# Patient Record
Sex: MALE | Race: WHITE | NOT HISPANIC OR LATINO | Employment: OTHER | URBAN - METROPOLITAN AREA
[De-identification: names, ages, dates, MRNs, and addresses within clinical notes are randomized per-mention and may not be internally consistent; named-entity substitution may affect disease eponyms.]

---

## 2023-02-21 LAB — PROSTATE SPECIFIC AG (NG/ML) IN SER/PLAS: 2.43 NG/ML (ref 0–4)

## 2023-08-29 LAB — PROSTATE SPECIFIC AG (NG/ML) IN SER/PLAS: 3.06 NG/ML (ref 0–4)

## 2023-10-16 PROBLEM — G62.9 PERIPHERAL NEUROPATHY: Status: ACTIVE | Noted: 2023-10-16

## 2023-10-16 PROBLEM — Z92.3 HISTORY OF RADIATION THERAPY: Status: ACTIVE | Noted: 2023-10-16

## 2023-10-16 PROBLEM — I50.32 CHRONIC DIASTOLIC CONGESTIVE HEART FAILURE (MULTI): Status: ACTIVE | Noted: 2023-10-16

## 2023-10-16 PROBLEM — L57.0 ACTINIC KERATOSIS: Status: ACTIVE | Noted: 2019-09-23

## 2023-10-16 PROBLEM — R09.89 CAROTID BRUIT: Status: ACTIVE | Noted: 2023-10-16

## 2023-10-16 PROBLEM — K57.32 DIVERTICULITIS, COLON: Status: ACTIVE | Noted: 2023-10-16

## 2023-10-16 PROBLEM — L85.3 XEROSIS CUTIS: Status: ACTIVE | Noted: 2019-09-23

## 2023-10-16 PROBLEM — L03.116 CELLULITIS OF LEFT LOWER EXTREMITY: Status: ACTIVE | Noted: 2023-10-16

## 2023-10-16 PROBLEM — L91.8 OTHER HYPERTROPHIC DISORDERS OF THE SKIN: Status: ACTIVE | Noted: 2019-09-23

## 2023-10-16 PROBLEM — N40.0 BPH (BENIGN PROSTATIC HYPERPLASIA): Status: ACTIVE | Noted: 2023-10-16

## 2023-10-16 PROBLEM — C44.329 SQUAMOUS CELL CARCINOMA OF SKIN OF OTHER PARTS OF FACE: Status: ACTIVE | Noted: 2019-09-23

## 2023-10-16 PROBLEM — R91.8 PULMONARY NODULES: Status: ACTIVE | Noted: 2023-10-16

## 2023-10-16 PROBLEM — I73.9 CLAUDICATION (CMS-HCC): Status: ACTIVE | Noted: 2023-10-16

## 2023-10-16 PROBLEM — C61 PROSTATE CANCER (MULTI): Status: ACTIVE | Noted: 2023-10-16

## 2023-10-16 PROBLEM — B35.1 ONYCHOMYCOSIS OF TOENAIL: Status: ACTIVE | Noted: 2023-10-16

## 2023-10-16 PROBLEM — L82.1 OTHER SEBORRHEIC KERATOSIS: Status: ACTIVE | Noted: 2019-09-23

## 2023-10-16 PROBLEM — I65.21 MORE THAN 50 PERCENT STENOSIS OF RIGHT INTERNAL CAROTID ARTERY: Status: ACTIVE | Noted: 2023-10-16

## 2023-10-16 PROBLEM — C44.42 SQUAMOUS CELL CARCINOMA OF SKIN OF SCALP AND NECK: Status: ACTIVE | Noted: 2019-09-23

## 2023-10-16 PROBLEM — I10 HTN (HYPERTENSION): Status: ACTIVE | Noted: 2023-10-16

## 2023-10-16 PROBLEM — Z85.828 PERSONAL HISTORY OF OTHER MALIGNANT NEOPLASM OF SKIN: Status: ACTIVE | Noted: 2019-09-23

## 2023-10-16 PROBLEM — Z98.890 H/O CAROTID ENDARTERECTOMY: Status: ACTIVE | Noted: 2023-10-16

## 2023-10-16 PROBLEM — L81.4 OTHER MELANIN HYPERPIGMENTATION: Status: ACTIVE | Noted: 2019-09-23

## 2023-10-16 PROBLEM — D18.01 HEMANGIOMA OF SKIN AND SUBCUTANEOUS TISSUE: Status: ACTIVE | Noted: 2019-09-23

## 2023-10-16 PROBLEM — Z79.818 ANDROGEN DEPRIVATION THERAPY: Status: ACTIVE | Noted: 2023-10-16

## 2023-10-16 RX ORDER — HYDROCHLOROTHIAZIDE 12.5 MG/1
CAPSULE ORAL
COMMUNITY
Start: 2023-03-21

## 2023-10-16 RX ORDER — SOLIFENACIN SUCCINATE 10 MG/1
10 TABLET, FILM COATED ORAL DAILY
COMMUNITY
End: 2023-10-18 | Stop reason: ALTCHOICE

## 2023-10-16 RX ORDER — ALBUTEROL SULFATE 90 UG/1
AEROSOL, METERED RESPIRATORY (INHALATION)
COMMUNITY
Start: 2023-04-18

## 2023-10-16 RX ORDER — UREA 200 MG/G
CREAM TOPICAL
COMMUNITY
Start: 2019-08-09 | End: 2023-10-18 | Stop reason: ALTCHOICE

## 2023-10-16 RX ORDER — METRONIDAZOLE 500 MG/1
TABLET ORAL
COMMUNITY
End: 2023-10-18 | Stop reason: ALTCHOICE

## 2023-10-16 RX ORDER — ASPIRIN 81 MG/1
1 TABLET ORAL DAILY
COMMUNITY
Start: 2018-08-23

## 2023-10-16 RX ORDER — TAMSULOSIN HYDROCHLORIDE 0.4 MG/1
1 CAPSULE ORAL DAILY
COMMUNITY
Start: 2017-06-07 | End: 2024-03-18 | Stop reason: SDUPTHER

## 2023-10-16 RX ORDER — LISINOPRIL 5 MG/1
TABLET ORAL
COMMUNITY

## 2023-10-18 ENCOUNTER — OFFICE VISIT (OUTPATIENT)
Dept: CARDIOLOGY | Facility: HOSPITAL | Age: 88
End: 2023-10-18
Payer: MEDICARE

## 2023-10-18 ENCOUNTER — HOSPITAL ENCOUNTER (OUTPATIENT)
Dept: VASCULAR MEDICINE | Facility: HOSPITAL | Age: 88
Discharge: HOME | End: 2023-10-18
Payer: MEDICARE

## 2023-10-18 VITALS
SYSTOLIC BLOOD PRESSURE: 164 MMHG | HEART RATE: 71 BPM | WEIGHT: 192.02 LBS | DIASTOLIC BLOOD PRESSURE: 74 MMHG | OXYGEN SATURATION: 98 % | BODY MASS INDEX: 27.55 KG/M2

## 2023-10-18 DIAGNOSIS — Z98.890 H/O CAROTID ENDARTERECTOMY: Primary | ICD-10-CM

## 2023-10-18 DIAGNOSIS — I65.21 MORE THAN 50 PERCENT STENOSIS OF RIGHT INTERNAL CAROTID ARTERY: ICD-10-CM

## 2023-10-18 DIAGNOSIS — R09.89 BRUIT: ICD-10-CM

## 2023-10-18 PROCEDURE — 3078F DIAST BP <80 MM HG: CPT | Performed by: INTERNAL MEDICINE

## 2023-10-18 PROCEDURE — 93880 EXTRACRANIAL BILAT STUDY: CPT

## 2023-10-18 PROCEDURE — 1126F AMNT PAIN NOTED NONE PRSNT: CPT | Performed by: INTERNAL MEDICINE

## 2023-10-18 PROCEDURE — 93880 EXTRACRANIAL BILAT STUDY: CPT | Performed by: INTERNAL MEDICINE

## 2023-10-18 PROCEDURE — 3077F SYST BP >= 140 MM HG: CPT | Performed by: INTERNAL MEDICINE

## 2023-10-18 PROCEDURE — 99214 OFFICE O/P EST MOD 30 MIN: CPT | Mod: PO | Performed by: INTERNAL MEDICINE

## 2023-10-18 PROCEDURE — 99214 OFFICE O/P EST MOD 30 MIN: CPT | Performed by: INTERNAL MEDICINE

## 2023-10-18 PROCEDURE — 1160F RVW MEDS BY RX/DR IN RCRD: CPT | Performed by: INTERNAL MEDICINE

## 2023-10-18 PROCEDURE — 1159F MED LIST DOCD IN RCRD: CPT | Performed by: INTERNAL MEDICINE

## 2023-10-18 RX ORDER — ATORVASTATIN CALCIUM 20 MG/1
20 TABLET, FILM COATED ORAL DAILY
Qty: 90 TABLET | Refills: 3 | Status: SHIPPED | OUTPATIENT
Start: 2023-10-18 | End: 2024-10-17

## 2023-10-18 NOTE — PROGRESS NOTES
Chief Complaint:   Carotid artery stenosis     History Of Present Illness:    Mike Wiley is a 89 y.o. male who follows up for asymptomatic carotid artery stenosis. He had right CEA about two decades ago. FAST reviewed; no symptoms. Doing well. Feels unsteady at times on his feet. We discussed physical therapy for gait and balance but he is not interested in going to therapy.    Going to [facility] for 12 weeks. Will be back in March.     Last Recorded Vitals:  Vitals:    10/18/23 1457   BP: 164/74   Pulse: 71   SpO2: 98%   Weight: 87.1 kg (192 lb 0.3 oz)       Past Medical History:   Diagnosis Date    Personal history of other diseases of the circulatory system 06/16/2020    History of aortic valve stenosis    Personal history of other diseases of the musculoskeletal system and connective tissue     History of arthritis     Past Surgical History:   Procedure Laterality Date    CT ABDOMEN PELVIS ANGIOGRAM W AND/OR WO IV CONTRAST  6/24/2020    CT ABDOMEN PELVIS ANGIOGRAM W AND/OR WO IV CONTRAST 6/24/2020 [facility] [place] ANCILLARY LEGACY    CT NECK ANGIO W AND WO IV CONTRAST  7/21/2020    CT NECK ANGIO W AND WO IV CONTRAST 7/21/2020 [facility] ANCILLARY LEGACY    CT NECK ANGIO W AND WO IV CONTRAST  11/21/2018    CT NECK ANGIO W AND WO IV CONTRAST 11/21/2018 [facility] ANCILLARY LEGAC[facility]    HERNIA REPAIR  07/26/2017    Hernia Repair    OTHER SURGICAL HISTORY  07/26/2017    Curettage Of Carpal Bones           Social History:  He reports that he has been smoking pipe. He has never used smokeless tobacco. He reports that he does not drink alcohol and does not use drugs.    Family History:  Family History   Problem Relation Name Age of Onset    Other (cardiac disorder) Mother      Stroke Father      Esophageal cancer Brother          Allergies:  Patient has no known allergies.    Outpatient Medications:  Current Outpatient Medications   Medication Instructions    aspirin 81 mg EC tablet 1 tablet, oral, Daily    GABAPENTIN ORAL 1 tablet, oral,  "Daily    hydroCHLOROthiazide (Microzide) 12.5 mg capsule     lisinopril 5 mg tablet TAKE 1 TABLET ONE TIME DAILY (DOSE DECREASE)    metroNIDAZOLE (Flagyl) 500 mg tablet     solifenacin (VESICARE) 10 mg, oral, Daily    tamsulosin (Flomax) 0.4 mg 24 hr capsule 1 tablet, oral, Daily    urea (Carmol) 20 % cream 1 Application    Ventolin HFA 90 mcg/actuation inhaler        Physical Exam:  No distress  No JVD or carotid bruits  Lungs clear bilaterally  Heart regular and without murmurs  Abdomen soft and non-tender  No leg swelling  Pulses intact       Last Labs:  CBC -  Lab Results   Component Value Date    WBC 4.6 07/13/2020    HGB 12.6 (L) 07/13/2020    HCT 39.9 (L) 07/13/2020    MCV 96 07/13/2020     (L) 07/13/2020       CMP -  Lab Results   Component Value Date    CALCIUM 9.3 10/19/2020    PHOS 2.9 07/08/2020    PROT 7.3 11/04/2019    ALBUMIN 3.6 07/08/2020    ALBUMIN 4.1 08/10/2018    AST 23 11/04/2019    ALT 27 11/04/2019    ALKPHOS 63 11/04/2019    BILITOT 0.5 11/04/2019       LIPID PANEL -   LDL 89  HDL 51 from 3/8/2023    RENAL FUNCTION PANEL -   Lab Results   Component Value Date    GLUCOSE 113 (H) 10/19/2020     10/19/2020    K 5.0 10/19/2020     10/19/2020    CO2 30 10/19/2020    ANIONGAP 11 10/19/2020    BUN 18 10/19/2020    CREATININE 1.09 10/19/2020    CALCIUM 9.3 10/19/2020    PHOS 2.9 07/08/2020    ALBUMIN 3.6 07/08/2020    ALBUMIN 4.1 08/10/2018        No results found for: \"BNP\", \"HGBA1C\"    Last Cardiology Tests:  Carotid duplex ultrasound today shows bilateral > 70% stenosis by PSV criteria. The right ICA/CCA ratio has decreased (because of increased velocities in the common); left ICA is stable      Assessment/Plan   Diagnoses and all orders for this visit:  H/O carotid endarterectomy  -     Vascular US Carotid Artery Duplex Bilateral; Future (6 months)  More than 50 percent stenosis of right internal carotid artery  -     Vascular US Carotid Artery Duplex Bilateral; Future  -     " atorvastatin (Lipitor) 20 mg tablet; Take 1 tablet (20 mg) by mouth once daily.      Charo Hansen MD

## 2023-10-18 NOTE — PATIENT INSTRUCTIONS
I want you to start a cholesterol-lowering medication called atorvastatin to help prevent progression of carotid artery blockages. I e-scripted 90 days with refills to [facility]. If you have issues or concerns with the new medication, please call my office to let me know.    Should you have questions, please do not hesitate to call my office at [phone].    I want to see you back in 6 months with a repeat carotid ultrasound.

## 2024-03-18 ENCOUNTER — OFFICE VISIT (OUTPATIENT)
Dept: UROLOGY | Facility: CLINIC | Age: 89
End: 2024-03-18
Payer: MEDICARE

## 2024-03-18 ENCOUNTER — LAB (OUTPATIENT)
Dept: LAB | Facility: LAB | Age: 89
End: 2024-03-18
Payer: MEDICARE

## 2024-03-18 DIAGNOSIS — N40.1 BENIGN PROSTATIC HYPERPLASIA WITH LOWER URINARY TRACT SYMPTOMS, SYMPTOM DETAILS UNSPECIFIED: ICD-10-CM

## 2024-03-18 DIAGNOSIS — C61 PROSTATE CANCER (MULTI): ICD-10-CM

## 2024-03-18 LAB — PSA SERPL-MCNC: 2.15 NG/ML

## 2024-03-18 PROCEDURE — 84153 ASSAY OF PSA TOTAL: CPT

## 2024-03-18 PROCEDURE — 36415 COLL VENOUS BLD VENIPUNCTURE: CPT

## 2024-03-18 PROCEDURE — 99214 OFFICE O/P EST MOD 30 MIN: CPT | Performed by: STUDENT IN AN ORGANIZED HEALTH CARE EDUCATION/TRAINING PROGRAM

## 2024-03-18 RX ORDER — SOLIFENACIN SUCCINATE 10 MG/1
10 TABLET, FILM COATED ORAL DAILY
Qty: 30 TABLET | Refills: 11 | Status: SHIPPED | OUTPATIENT
Start: 2024-03-18 | End: 2024-05-15 | Stop reason: WASHOUT

## 2024-03-18 RX ORDER — SOLIFENACIN SUCCINATE 10 MG/1
10 TABLET, FILM COATED ORAL DAILY
COMMUNITY
Start: 2023-10-09 | End: 2024-03-18 | Stop reason: SDUPTHER

## 2024-03-18 RX ORDER — TAMSULOSIN HYDROCHLORIDE 0.4 MG/1
0.8 CAPSULE ORAL DAILY
Qty: 60 CAPSULE | Refills: 11 | Status: SHIPPED | OUTPATIENT
Start: 2024-03-18 | End: 2025-03-18

## 2024-03-18 NOTE — PROGRESS NOTES
Subjective   Patient ID: Mike Wiley is a 89 y.o. male.    HPI  89 yo male with history of [place] 7 Prostate cancer. Clinical T2cNx, which he completed definitive curative intent radiation in March 2018 with 6 months of anti androgen therapy. F/U BPH. Now, PSA is 3.06, increased from 2.4 in 6 months.      On tamsulosin single dose, he tried myrbetriq and did not help with symptoms. he is out of medications now. He is on solifenacin 10 mg. Notes urinary frequency and nocturia.     02/20/23 PET/PSMA:  1. Few F-18 PSMA avid foci in the prostate gland, likely representing  recurrent prostate cancer.  2. No PET evidence of metastasis to bilateral seminal vesicles or  anterior rectal wall. No PET evidence of locoregional isa or  distant metastasis.    Lab Results   Component Value Date    PSA 2.15 03/18/2024    PSA 3.06 08/29/2023    PSA 2.43 02/21/2023    PSA 3.19 10/27/2022    PSA 1.69 05/23/2022    PSA 1.57 11/08/2021       Review of Systems    Objective   Physical Exam    Assessment/Plan   89 yo male with history of [place] 7 Prostate cancer. Clinical T2cNx, which he completed definitive curative intent radiation in March 2018 with 6 months of anti androgen therapy. F/U BPH. Now, PSA is 3.06, increased from 2.4 in 6 months.      PET/PSMA revealed a Few F-18 PSMA avid foci in the prostate gland, likely representing  recurrent prostate cancer. No evidence of metastasis to bilateral seminal vesicles or anterior rectal wall. No PET evidence of locoregional isa or distant metastasis.     On tamsulosin single dose, he tried myrbetriq and did not help with symptoms. he is out of medications now. He is on solifenacin 10 mg. Notes urinary frequency and nocturia. Will increase tamsulosin x 2. We discussed risks, benefits, alternatives and side effects.     We will repeat PSA today and decide if PET is recommended depeding on results.     Plan:  Make tamsulosin x 2, 0.4 mg 1 tab daily at bedtime,   Continue vesicare 10 mg  one tab daily in the morning.   PSA today.   According to PSA we will decide on repeating PET scan.   FUV 2 months.   Diagnoses and all orders for this visit:  Benign prostatic hyperplasia with lower urinary tract symptoms, symptom details unspecified  -     tamsulosin (Flomax) 0.4 mg 24 hr capsule; Take 2 capsules (0.8 mg) by mouth once daily.  -     solifenacin (VESIcare) 10 mg tablet; Take 1 tablet (10 mg) by mouth once daily.  Prostate cancer (CMS/HCC)  -     Prostate Specific Antigen; Future      Scribe Attestation  By signing my name below, I, Dora Loo, Scribe attest that this documentation has been prepared under the direction and in the presence of Edith Hearn MD.

## 2024-05-15 ENCOUNTER — OFFICE VISIT (OUTPATIENT)
Dept: CARDIOLOGY | Facility: HOSPITAL | Age: 89
End: 2024-05-15
Payer: MEDICARE

## 2024-05-15 ENCOUNTER — HOSPITAL ENCOUNTER (OUTPATIENT)
Dept: VASCULAR MEDICINE | Facility: HOSPITAL | Age: 89
Discharge: HOME | End: 2024-05-15
Payer: MEDICARE

## 2024-05-15 VITALS
OXYGEN SATURATION: 100 % | SYSTOLIC BLOOD PRESSURE: 159 MMHG | DIASTOLIC BLOOD PRESSURE: 75 MMHG | HEART RATE: 64 BPM | WEIGHT: 188.05 LBS | BODY MASS INDEX: 26.98 KG/M2

## 2024-05-15 DIAGNOSIS — Z98.890 H/O CAROTID ENDARTERECTOMY: Primary | ICD-10-CM

## 2024-05-15 DIAGNOSIS — I65.21 MORE THAN 50 PERCENT STENOSIS OF RIGHT INTERNAL CAROTID ARTERY: ICD-10-CM

## 2024-05-15 DIAGNOSIS — Z98.890 H/O CAROTID ENDARTERECTOMY: ICD-10-CM

## 2024-05-15 PROCEDURE — 93880 EXTRACRANIAL BILAT STUDY: CPT | Performed by: INTERNAL MEDICINE

## 2024-05-15 PROCEDURE — 1160F RVW MEDS BY RX/DR IN RCRD: CPT | Performed by: INTERNAL MEDICINE

## 2024-05-15 PROCEDURE — 3078F DIAST BP <80 MM HG: CPT | Performed by: INTERNAL MEDICINE

## 2024-05-15 PROCEDURE — 99214 OFFICE O/P EST MOD 30 MIN: CPT | Performed by: INTERNAL MEDICINE

## 2024-05-15 PROCEDURE — 3077F SYST BP >= 140 MM HG: CPT | Performed by: INTERNAL MEDICINE

## 2024-05-15 PROCEDURE — 1159F MED LIST DOCD IN RCRD: CPT | Performed by: INTERNAL MEDICINE

## 2024-05-15 PROCEDURE — 93880 EXTRACRANIAL BILAT STUDY: CPT

## 2024-05-15 NOTE — PROGRESS NOTES
Asymptomatic carotid artery stenosis    History of Present Illness:  Mike Wiley is a/an 89 y.o. man who follows up for asymptomatic carotid stenosis.     He was last seen on 10/18/2023.    He has a history of right CEA twenty years ago, with restenosis of the right ICA and progressive stenosis of the left ICA.    He denies hemispheric symptoms including weakness, loss of sensation, and facial droop; amaurosis; garbled speech or word-finding difficulties.    He continues to smoke a pipe. Inhales lightly. Prior cigarettes.    Past Medical History:   Diagnosis Date    Personal history of other diseases of the circulatory system 06/16/2020    History of aortic valve stenosis    Personal history of other diseases of the musculoskeletal system and connective tissue     History of arthritis     Past Surgical History:   Procedure Laterality Date    CT ABDOMEN PELVIS ANGIOGRAM W AND/OR WO IV CONTRAST  6/24/2020    CT ABDOMEN PELVIS ANGIOGRAM W AND/OR WO IV CONTRAST 6/24/2020 [facility] [place] ANCILLARY LEGACY    CT ANGIO NECK  7/21/2020    CT NECK ANGIO W AND WO IV CONTRAST 7/21/2020 [facility] ANCILLARY LEGACY    CT ANGIO NECK  11/21/2018    CT NECK ANGIO W AND WO IV CONTRAST 11/21/2018 [facility] ANCILLARY LEGACY    HERNIA REPAIR  07/26/2017    Hernia Repair    OTHER SURGICAL HISTORY  07/26/2017    Curettage Of Carpal Bones     Social History     Tobacco Use    Smoking status: Every Day     Types: Pipe    Smokeless tobacco: Never   Substance Use Topics    Alcohol use: Never    Drug use: Never     Family History   Problem Relation Name Age of Onset    Other (cardiac disorder) Mother      Stroke Father      Esophageal cancer Brother       Current Outpatient Medications   Medication Sig Dispense Refill    aspirin 81 mg EC tablet Take 1 tablet (81 mg) by mouth once daily.      atorvastatin (Lipitor) 20 mg tablet Take 1 tablet (20 mg) by mouth once daily. 90 tablet 3    GABAPENTIN ORAL Take 1 tablet by mouth once daily.      hydroCHLOROthiazide  (Microzide) 12.5 mg capsule       lisinopril 5 mg tablet TAKE 1 TABLET ONE TIME DAILY (DOSE DECREASE)      tamsulosin (Flomax) 0.4 mg 24 hr capsule Take 2 capsules (0.8 mg) by mouth once daily. 60 capsule 11    solifenacin (VESIcare) 10 mg tablet Take 1 tablet (10 mg) by mouth once daily. (Patient not taking: Reported on 5/15/2024) 30 tablet 11    Ventolin HFA 90 mcg/actuation inhaler        No current facility-administered medications for this visit.       Physical Examination:  Blood pressure 159/75, pulse 64, weight 85.3 kg (188 lb 0.8 oz), SpO2 100%.  No distress  No JVD or carotid bruits  Lungs clear bilaterally  Heart regular and without murmurs  Abdomen soft and non-tender  No leg swelling  Pulses intact    Pertinent Labs:  Cholesterol, Total  <200 mg/dL 94   Comment: <200 mg/dL, Desirable   200-239 mg/dL, Borderline high  >239 mg/dL, High   Triglyceride  <150 mg/dL 50   Comment: <150 mg/dL, Normal   150-199 mg/dL, Borderline high   200-499 mg/dL, High  >499 mg/dL, Very high   HDL Cholesterol  >39 mg/dL 55   Comment:  40-59 mg/dL, Acceptable  >59 mg/dL, High: Negative risk factor for coronary heart disease  <40 mg/dL, Low: Positive risk factor for coronary heart disease   Non HDL Cholesterol  <130 mg/dL 39   Comment: <130 mg/dL, Optimal   130-159 mg/dL, Near optimal/above optimal   160-189 mg/dL, Borderline high   190-219 mg/dL, High  >219 mg/dL, Very high  Secondary prevention optimal non HDL Cholesterol levels are recommended to be <100 mg/dL   Fasting Time  hrs 12   VLDL Cholesterol  <30 mg/dL 10   TC:HDL Ratio  <5.10 1.71   LDL Cholesterol  <100 mg/dL 29   Comment: <100 mg/dL, Optimal   100-129 mg/dL, Near optimal/above optimal   130-159 mg/dL, Borderline high   160-189 mg/dL, High  >189 mg/dL, Very high  Secondary prevention optimal LDL Cholesterol levels are recommended to be < 70 mg/dL   LDL:HDL Ratio  <2.54 0.53       Pertinent Imaging:  Carotid artery duplex ultrasound 05/15/24 (personally  reviewed)  Right ICA with elevated PSV, EDV, and ICA/CCA ratio consistent with greater than 70% stenosis. This is stable. Left ICA PSV has increased, though EDV remains below 100 cm/s and ICA/CCA ratio is less than 4. This likely represents a stenosis at the low end of the 70-99% category, and velocities may be overestimated due to contralateral critical stenosis.    Diagnoses and all orders for this visit:  H/O carotid endarterectomy (Primary)  More than 50 percent stenosis of right internal carotid artery  I'm concerned that he has significant right carotid stenosis and seems to be progressing on the left.  I would like him to see one of our vascular interventionalists to get an opinion on candidacy for carotid stenting  Continue statin    Follow up to be determined.    Charo Hansen MD, MS

## 2024-05-22 ENCOUNTER — OFFICE VISIT (OUTPATIENT)
Dept: UROLOGY | Facility: CLINIC | Age: 89
End: 2024-05-22
Payer: MEDICARE

## 2024-05-22 DIAGNOSIS — N40.1 BENIGN PROSTATIC HYPERPLASIA WITH URINARY FREQUENCY: ICD-10-CM

## 2024-05-22 DIAGNOSIS — R35.0 BENIGN PROSTATIC HYPERPLASIA WITH URINARY FREQUENCY: ICD-10-CM

## 2024-05-22 DIAGNOSIS — Z85.46 HISTORY OF PROSTATE CANCER: ICD-10-CM

## 2024-05-22 RX ORDER — SOLIFENACIN SUCCINATE 10 MG/1
10 TABLET, FILM COATED ORAL DAILY
Qty: 30 TABLET | Refills: 11 | Status: SHIPPED | OUTPATIENT
Start: 2024-05-22 | End: 2025-05-22

## 2024-05-22 NOTE — PROGRESS NOTES
Subjective   Patient ID: Mike Wiley is a 89 y.o. male.    HPI  89 yo male with history of [place] 7 Prostate cancer. Clinical T2cNx, which he completed definitive curative intent radiation in March 2018 with 6 months of anti androgen therapy. F/U BPH. Now, PSA is 2.15.      On tamsulosin double dose, sxs well controlled. Acceptable quality of life, wishes to continue on med regimen.      02/20/23 PET/PSMA:  1. Few F-18 PSMA avid foci in the prostate gland, likely representing  recurrent prostate cancer.  2. No PET evidence of metastasis to bilateral seminal vesicles or  anterior rectal wall. No PET evidence of locoregional isa or  distant metastasis.     Lab Results   Component Value Date    PSA 2.15 03/18/2024    PSA 3.06 08/29/2023    PSA 2.43 02/21/2023    PSA 3.19 10/27/2022    PSA 1.69 05/23/2022    PSA 1.57 11/08/2021       Review of Systems    Objective   Physical Exam    Assessment/Plan   89 yo male with history of [place] 7 Prostate cancer. Clinical T2cNx, which he completed definitive curative intent radiation in March 2018 with 6 months of anti androgen therapy. F/U BPH. Now, PSA is 2.15.     PET/PSMA revealed a Few F-18 PSMA avid foci in the prostate gland, likely representing  recurrent prostate cancer. No evidence of metastasis to bilateral seminal vesicles or anterior rectal wall. No PET evidence of locoregional isa or distant metastasis.      On tamsulosin double dose, doing well on medication. We discussed his urinary sxs, and changing his meds, we will continue current treatment since he has acceptable quality of life.      We will repeat PSA in September 2024.     Plan:  Continue tamsulosin 0.4  mg 2 pills daily at bedtime.  PSA September 2024.   FUV October 2024.   Diagnoses and all orders for this visit:  History of prostate cancer  -     Prostate Specific Antigen; Future  Benign prostatic hyperplasia with urinary frequency  -     solifenacin (VESIcare) 10 mg tablet; Take 1 tablet (10 mg)  by mouth once daily. Swallow tablet whole; do not crush, chew, or split.      Scribe Attestation  By signing my name below, I, Dora Loo, Scribsantiago attest that this documentation has been prepared under the direction and in the presence of Edith Hearn MD.

## 2024-06-07 ENCOUNTER — DOCUMENTATION (OUTPATIENT)
Dept: HEMATOLOGY/ONCOLOGY | Facility: CLINIC | Age: 89
End: 2024-06-07
Payer: MEDICARE

## 2024-07-10 ENCOUNTER — OFFICE VISIT (OUTPATIENT)
Dept: CARDIOLOGY | Facility: HOSPITAL | Age: 89
End: 2024-07-10
Payer: MEDICARE

## 2024-07-10 VITALS
HEART RATE: 75 BPM | BODY MASS INDEX: 27.01 KG/M2 | OXYGEN SATURATION: 95 % | DIASTOLIC BLOOD PRESSURE: 62 MMHG | SYSTOLIC BLOOD PRESSURE: 104 MMHG | WEIGHT: 188.27 LBS

## 2024-07-10 DIAGNOSIS — I10 HYPERTENSION, UNSPECIFIED TYPE: Primary | ICD-10-CM

## 2024-07-10 DIAGNOSIS — E78.5 HYPERLIPIDEMIA, UNSPECIFIED HYPERLIPIDEMIA TYPE: ICD-10-CM

## 2024-07-10 PROCEDURE — 99213 OFFICE O/P EST LOW 20 MIN: CPT | Performed by: NURSE PRACTITIONER

## 2024-07-10 RX ORDER — CLOPIDOGREL BISULFATE 75 MG/1
75 TABLET ORAL DAILY
COMMUNITY

## 2024-07-10 ASSESSMENT — ENCOUNTER SYMPTOMS
NEUROLOGICAL NEGATIVE: 1
EYES NEGATIVE: 1
ENDOCRINE NEGATIVE: 1
GASTROINTESTINAL NEGATIVE: 1
PSYCHIATRIC NEGATIVE: 1
HEMATOLOGIC/LYMPHATIC NEGATIVE: 1
CARDIOVASCULAR NEGATIVE: 1
RESPIRATORY NEGATIVE: 1
MYALGIAS: 1

## 2024-07-10 NOTE — PROGRESS NOTES
Referred by Dr. Baker ref. provider found for Follow-up (1 yr.)     History Of Present Illness:    Mike Cho is a very pleasant 89 year old gentleman with a history of aortic stenosis s/p TAVR (7/2020), he is here for a follow up visit. The patient is seen in collaboration with Dr. Haywood. Denies chest pain or shortness of breath. Continues to stay active. Wears compression stocking helps with swelling.     Review of Systems   Constitutional: Positive for malaise/fatigue.   HENT: Negative.     Eyes: Negative.    Cardiovascular: Negative.    Respiratory: Negative.     Endocrine: Negative.    Hematologic/Lymphatic: Negative.    Skin: Negative.    Musculoskeletal:  Positive for muscle weakness and myalgias.   Gastrointestinal: Negative.    Neurological: Negative.    Psychiatric/Behavioral: Negative.        Past Medical History:  He has a past medical history of Heart valve disease, Hypertension, Myocardial infarction (Multi), Personal history of other diseases of the circulatory system (06/16/2020), and Personal history of other diseases of the musculoskeletal system and connective tissue.    Past Surgical History:  He has a past surgical history that includes Other surgical history (07/26/2017); Hernia repair (07/26/2017); CT angio abdomen pelvis w and or wo IV IV contrast (06/24/2020); CT angio neck (07/21/2020); CT angio neck (11/21/2018); and Aortic valve replacement.      Social History:  He reports that he has been smoking pipe. He has never used smokeless tobacco. He reports that he does not drink alcohol and does not use drugs.    Family History:  Family History   Problem Relation Name Age of Onset    Other (cardiac disorder) Mother      Stroke Father Leo cho     Esophageal cancer Brother          Allergies:  Patient has no known allergies.    Outpatient Medications:  Current Outpatient Medications   Medication Instructions    aspirin 81 mg EC tablet 1 tablet, oral, Daily    atorvastatin (LIPITOR) 20 mg,  "oral, Daily    clopidogrel (PLAVIX) 75 mg, oral, Daily    hydroCHLOROthiazide (Microzide) 12.5 mg capsule Take 1 capsule (12.5 mg) by mouth once daily in the morning.    lisinopril 5 mg tablet TAKE 1 TABLET ONE TIME DAILY (DOSE DECREASE)    solifenacin (VESICARE) 10 mg, oral, Daily, Swallow tablet whole; do not crush, chew, or split.    tamsulosin (FLOMAX) 0.8 mg, oral, Daily    Ventolin HFA 90 mcg/actuation inhaler         Last Recorded Vitals:  Vitals:    07/10/24 1117   BP: 104/62   Pulse: 75   SpO2: 95%   Weight: 85.4 kg (188 lb 4.4 oz)       Physical Exam:  Physical Exam  Vitals reviewed.   HENT:      Head: Normocephalic.      Nose: Nose normal.   Eyes:      Pupils: Pupils are equal, round, and reactive to light.   Cardiovascular:      Rate and Rhythm: Normal rate and regular rhythm.   Pulmonary:      Effort: Pulmonary effort is normal.      Breath sounds: Normal breath sounds.   Abdominal:      General: Abdomen is flat.      Palpations: Abdomen is soft.   Musculoskeletal:         General: Normal range of motion.      Cervical back: Normal range of motion.   Skin:     General: Skin is warm and dry.   Neurological:      General: No focal deficit present.      Mental Status: He is alert and oriented to person, place, and time.   Psychiatric:         Mood and Affect: Mood normal.     Neck supple no JVP + Bruit          Last Labs:  CBC -  Lab Results   Component Value Date    WBC 4.6 07/13/2020    HGB 12.6 (L) 07/13/2020    HCT 39.9 (L) 07/13/2020    MCV 96 07/13/2020     (L) 07/13/2020       CMP -  Lab Results   Component Value Date    CALCIUM 9.3 10/19/2020    PHOS 2.9 07/08/2020    PROT 7.3 11/04/2019    ALBUMIN 3.6 07/08/2020    ALBUMIN 4.1 08/10/2018    AST 23 11/04/2019    ALT 27 11/04/2019    ALKPHOS 63 11/04/2019    BILITOT 0.5 11/04/2019       LIPID PANEL -   No results found for: \"CHOL\", \"TRIG\", \"HDL\", \"CHHDL\", \"LDLF\", \"VLDL\", \"NHDL\"    RENAL FUNCTION PANEL -   Lab Results   Component Value Date    " "GLUCOSE 113 (H) 10/19/2020     10/19/2020    K 5.0 10/19/2020     10/19/2020    CO2 30 10/19/2020    ANIONGAP 11 10/19/2020    BUN 18 10/19/2020    CREATININE 1.09 10/19/2020    CALCIUM 9.3 10/19/2020    PHOS 2.9 07/08/2020    ALBUMIN 3.6 07/08/2020    ALBUMIN 4.1 08/10/2018        No results found for: \"BNP\", \"HGBA1C\"    Last Cardiology Tests:  ECG:    Echo:  Echocardiogram 9/11/2021  1. The left ventricular systolic function is normal with a 60-65% estimated  ejection fraction.  2. Spectral Doppler shows an impaired relaxation pattern of left ventricular  diastolic filling.  3. There is a transcatheter aortic valve replacement.     Echocardiogram 7/8/2020   1. The left ventricular systolic function is normal with a 60% estimated  ejection fraction.  2. Spectral Doppler shows an impaired relaxation pattern of left ventricular  diastolic filling.  3. There is mild to moderate asymmetric left ventricular hypertrophy.  4. There is a transcatheter aortic valve replacement.    Echocardiogram 7/7/2020  1. The left ventricular systolic function is normal with a 60-65% estimated  ejection fraction.  2. The left atrium is enlarged.  3. RVSP within normal limits.  4. Severe aortic valve stenosis.  5. There is aortic valve annular calcification.  6. There is severe aortic valve cusp calcification.  7. There is severe aortic valve thickening.  8. There is mild aortic valve regurgitation.  9. The pulmonary artery is not well visualized    Echo 5/28/2020:  1. The left ventricular systolic function is normal with a 60% estimated  ejection fraction.  2. Spectral Doppler shows an impaired relaxation pattern of left ventricular  diastolic filling.  3. Moderate to severe aortic valve stenosis. The aortic valve dimensionless  index is 0.23. The peak instantaneous gradient of the aortic valve is 52.7 mmHg.  The mean gradient of the aortic valve is 33.0 mmHg.  4. There is mild to moderate aortic valve regurgitation.  5. " There is mild mitral and tricuspid regurgitation.  6. The estimated pulmonary artery pressure is mildly elevated with the RVSP at  41.4 mmHg.     Echo 5/23/19:  1. The left ventricular systolic function is normal with a 60-65% estimated  ejection fraction.  2. Spectral Doppler shows an impaired relaxation pattern of left ventricular  diastolic filling.  3. Moderate to severe aortic valve stenosis. The aortic valve dimensionless  index is 0.23. There is mild aortic valve regurgitation. The peak instantaneous  gradient of the aortic valve is 47.6 mmHg. The mean gradient of the aortic valve  is 28.0 mmHg.  4. There is mild mitral, tricuspid, and pulmonic regurgitation.  5. The estimated pulmonary artery pressure is mildly elevated with the RVSP at  36.8 mmHg.     Echo 10/16/18:   1. The left ventricular systolic function is normal with a 55-60% estimated  ejection fraction.   2. Spectral Doppler shows an impaired relaxation pattern of left ventricular  diastolic filling.   3. There is mild to moderate tricuspid regurgitation.   4. Moderate to severe aortic valve stenosis. The aortic valve dimensionless  index is 0.27. The peak instantaneous gradient of the aortic valve is 49.0 mmHg.  The mean gradient of the aortic valve is 32.0 mmHg. DIVYA 0.85cm2 by VTI.   5. There is mild to moderate aortic valve regurgitation.   6. The estimated pulmonary artery pressure is mildly elevated with the RVSP at  43.9 mmHg.   Ejection Fractions:  LVEF 60-65%  Cath:  Right and Left heart cath 6/12/2020   1. Left main: no significant angiographic disease.  2. LAD: 30% proximal disease.  3. LCx: 40% proximal disease.  4. RCA: mild irregularities.  5. RA 7mmHg, RV 28/1, PA 24/9 (14), PCWP 7mmHg.  Stress Test:    Cardiac Imaging:  TAVR 7/7/2020   1. Transcatheter aortic valve replacement with successful implantation of an  Diaz Sapien3 26 mm valve.  2. Patient was enrolled in a research study and data was included in the  TVT  Registry.    Assessment/Plan   Very pleasant 89 year-old gentleman with history of prostate CA, carotid artery disease, and aortic stenosis s/p TAVR (7/2020). He continues to do well from a cardiac standpoint. Continues to try to stay active. Swelling is minimal today.  Heart rate and blood pressure are well controlled today. Currently following up with vascular for carotid artery stenosis.      Plan   -call with any questions   -follow up in one year   -continue Aspirin and Lisinopril   -continue Plavix and Atorvastatin       Jade Dyson, APRN-CNP

## 2024-07-17 ENCOUNTER — TELEPHONE (OUTPATIENT)
Dept: CARDIOLOGY | Facility: HOSPITAL | Age: 89
End: 2024-07-17
Payer: MEDICARE

## 2024-08-23 ENCOUNTER — OFFICE VISIT (OUTPATIENT)
Dept: CARDIOLOGY | Facility: HOSPITAL | Age: 89
End: 2024-08-23
Payer: MEDICARE

## 2024-08-23 VITALS
BODY MASS INDEX: 27.14 KG/M2 | HEART RATE: 70 BPM | WEIGHT: 189.15 LBS | DIASTOLIC BLOOD PRESSURE: 57 MMHG | OXYGEN SATURATION: 94 % | SYSTOLIC BLOOD PRESSURE: 104 MMHG

## 2024-08-23 DIAGNOSIS — I65.23 BILATERAL CAROTID ARTERY STENOSIS: Primary | ICD-10-CM

## 2024-08-23 PROCEDURE — 99213 OFFICE O/P EST LOW 20 MIN: CPT | Performed by: INTERNAL MEDICINE

## 2024-09-04 NOTE — PROGRESS NOTES
Primary Care Physician: Minal Salguero MD   Date of Visit: 08/23/2024  2:45 PM EDT  Type of Visit: New patient    Chief Complaint:  Carotid artery stenosis    HPI  Mike Wiley 89 y.o. male who presents to [facility].    Recently underwent carotid duplex demonstrating severe, bilateral disease.  He denies any strokes or amaurosis fugax.    Review of Systems   12 point review of systems was obtained in detail and is negative other than that noted above or below.     Past Medical/Surgical History:  Mike has a past medical history of Heart valve disease, Hypertension, Myocardial infarction (Multi), Personal history of other diseases of the circulatory system (06/16/2020), and Personal history of other diseases of the musculoskeletal system and connective tissue.    Mike has a past surgical history that includes Other surgical history (07/26/2017); Hernia repair (07/26/2017); CT angio abdomen pelvis w and or wo IV IV contrast (06/24/2020); CT angio neck (07/21/2020); CT angio neck (11/21/2018); and Aortic valve replacement.    Social/Family History:  Mike reports that he has been smoking pipe. He has never used smokeless tobacco. He reports that he does not drink alcohol and does not use drugs.    Mike's family history includes Esophageal cancer in his brother; Stroke in his father; cardiac disorder in his mother.    Allergies:  No Known Allergies    Medications:  Current Outpatient Medications   Medication Sig Dispense Refill    aspirin 81 mg EC tablet Take 1 tablet (81 mg) by mouth once daily.      atorvastatin (Lipitor) 20 mg tablet Take 1 tablet (20 mg) by mouth once daily. 90 tablet 3    clopidogrel (Plavix) 75 mg tablet Take 1 tablet (75 mg) by mouth once daily.      hydroCHLOROthiazide (Microzide) 12.5 mg capsule Take 1 capsule (12.5 mg) by mouth once daily in the morning.      lisinopril 5 mg tablet TAKE 1 TABLET ONE TIME DAILY (DOSE DECREASE)      solifenacin (VESIcare) 10 mg tablet Take  1 tablet (10 mg) by mouth once daily. Swallow tablet whole; do not crush, chew, or split. 30 tablet 11    tamsulosin (Flomax) 0.4 mg 24 hr capsule Take 2 capsules (0.8 mg) by mouth once daily. 60 capsule 11    Ventolin HFA 90 mcg/actuation inhaler        No current facility-administered medications for this visit.       Objective:   Vitals:    08/23/24 1446   BP: 104/57   Pulse: 70   SpO2: 94%       S1-S2 normal, regular rate and rhythm  Clear to auscultation bilaterally    Labs and Imaging:      Latest Ref Rng & Units 4/2/2020     1:01 PM 4/7/2020     2:05 PM 6/12/2020     7:28 AM 7/7/2020     5:17 PM 7/8/2020     4:08 AM 7/13/2020    10:51 AM 10/19/2020     3:07 PM   Electrolytes   Na 136 - 145 mmol/L 140   137  139  137  139  138    K 3.5 - 5.3 mmol/L 5.5  4.8  4.2  4.3  4.4  5.1  5.0    Cl 98 - 107 mmol/L 102   101  106  104  102  102    CO2 21 - 32 mmol/L 31   26  25  25  29  30    Cr 0.50 - 1.30 mg/dL 1.13   1.33  0.96  0.91  1.02  1.09    Ca 8.6 - 10.6 mg/dL 9.2   9.5  8.7  8.6  9.3  9.3    Phos 2.5 - 4.9 mg/dL 3.4     2.9            Latest Ref Rng & Units 11/20/2018     2:46 PM 5/16/2019    12:12 PM 11/4/2019    12:55 PM 6/12/2020     7:28 AM 7/7/2020     5:17 PM 7/8/2020     4:08 AM 7/13/2020    10:51 AM   CBC   Hb 13.5 - 17.5 g/dL 13.3  13.5  14.1  15.1  13.5  13.3  12.6    Hct 41.0 - 52.0 % 40.5  40.8  42.9  47.0  39.6  38.4  39.9    MCV 80 - 100 fL 90  91  90  93  88  88  96    RDW 11.5 - 14.5 % 12.6  12.4  13.0  12.4  12.7  12.4  12.9          Latest Ref Rng & Units 12/7/2017    12:27 PM 4/9/2018    12:43 PM 8/10/2018     7:14 PM 8/14/2018     2:10 PM 11/20/2018     2:46 PM 5/16/2019    12:12 PM 11/4/2019    12:55 PM   Lipids   ALT 10 - 52 U/L 20  21  19  27  17  16  27    AST 9 - 39 U/L 17  18  15  32  16  16  23          Latest Ref Rng & Units 2/13/2019     3:19 PM 3/2/2020     1:49 PM   Thyroid   TSH 0.44 - 3.98 mIU/L 1.22  2.26           No data to display                 No results found for:  "\"HGBA1C\", \"ALBUR\"     The ASCVD Risk score (Helena DK, et al., 2019) failed to calculate for the following reasons:    The 2019 ASCVD risk score is only valid for ages 40 to 79    The patient has a prior MI or stroke diagnosis     Echocardiogram: No results found for this or any previous visit.     Echocardiogram:     PHYSICIAN INTERPRETATION:  Left Ventricle: The left ventricular systolic function is normal. There are no regional wall motion abnormalities. The left ventricular cavity size is normal. Abnormal (paradoxical) septal motion, consistent with left bundle branch block. Spectral Doppler shows an impaired relaxation pattern of left ventricular diastolic filling.  Left Atrium: The left atrium is normal in size.  Right Ventricle: The right ventricle is normal in size. There is normal right ventricular global systolic function.  Right Atrium: The right atrium is mildly dilated.  Aortic Valve: The aortic valve appears abnormal. There is a transcatheter aortic valve replacement. There is trivial aortic valve regurgitation. The peak instantaneous gradient of the aortic valve is 25.0 mmHg. The mean gradient of the aortic valve is 13.9 mmHg. There is a Spaien 26 3 ultra TAVR. There is trival periprosthetic regurgitation. Peak gradient 24, mean 13mmHg.  Mitral Valve: The mitral valve is mildly thickened. There is trace mitral valve regurgitation.  Tricuspid Valve: The tricuspid valve is structurally normal. There is mild tricuspid regurgitation.  Pulmonic Valve: The pulmonic valve is structurally normal. There is trace pulmonic valve regurgitation.  Pericardium: There is a trivial pericardial effusion.  Aorta: The aortic root is abnormal. There is mild dilatation of the ascending aorta. The aortic root is at the upper limits of normal size.  Pulmonary Artery: The pulmonary artery is not well visualized. The tricuspid regurgitant velocity is 2.49 m/s, and with an estimated right atrial pressure of 8 mmHg, the " estimated pulmonary artery pressure is borderline elevated with the RVSP at 32.8 mmHg.  Systemic Veins: The inferior vena cava was not well visualized.  In comparison to the previous echocardiogram(s): Compared with study from 9/11/2020,. No signficant change.      CONCLUSIONS:  1. The left ventricular systolic function is normal.  2. Abnormal septal motion consistent with left bundle branch block.  3. Spectral Doppler shows an impaired relaxation pattern of left ventricular diastolic filling.  4. There is a Spaien 26 3 ultra TAVR. There is trival periprosthetic regurgitation. Peak gradient 24, mean 13mmHg.  5. There is a transcatheter aortic valve replacement.  6. The pulmonary artery is not well visualized.    Stress Testing: No results found for this or any previous visit from the past 1825 days.    Cardiac Catheterization:   ADULT CATH     Narrative  Ordered by an unspecified provider.    Cardiac Scoring: No results found for this or any previous visit from the past 1825 days.    AAA : No results found for this or any previous visit from the past 1825 days.    OTHER: No results found for this or any previous visit from the past 1825 days.        Assessment/Plan:   No diagnosis found.     Mike Wiley 89 y.o. male who presents establish care:    1.  Bilateral carotid artery stenosis  2.  Severe arctic stenosis status post SARAH 26 ultra transcatheter aortic valve placement    Discussed the risk, benefits, alternatives of revascularization versus medical therapy.  He would like to think about this and get back to me as to whether he would like to proceed with revascularization or not.  Doing quite well post TAVR.       ____________________________________________________________  Mal Travis MD

## 2024-09-10 ENCOUNTER — OFFICE VISIT (OUTPATIENT)
Dept: URGENT CARE | Age: 89
End: 2024-09-10
Payer: MEDICARE

## 2024-09-10 VITALS
HEART RATE: 77 BPM | HEIGHT: 70 IN | RESPIRATION RATE: 16 BRPM | TEMPERATURE: 98.1 F | SYSTOLIC BLOOD PRESSURE: 111 MMHG | DIASTOLIC BLOOD PRESSURE: 66 MMHG | BODY MASS INDEX: 27.92 KG/M2 | WEIGHT: 195 LBS | OXYGEN SATURATION: 89 %

## 2024-09-10 DIAGNOSIS — S51.812A LACERATION OF LEFT FOREARM, INITIAL ENCOUNTER: ICD-10-CM

## 2024-09-10 DIAGNOSIS — S41.112A LACERATION OF LEFT ARM WITH COMPLICATION, INITIAL ENCOUNTER: Primary | ICD-10-CM

## 2024-09-10 NOTE — PATIENT INSTRUCTIONS
Do not remove dressing for 72 hours, then remove, start washing it with soap and water and rinse off with normal Saline, pat dry and apply non adhesive dressing.   Starting today  apply Cool packs every several hours.  Follow up with wound clinic in 4 days

## 2024-09-10 NOTE — PROGRESS NOTES
"Subjective   Patient ID: Mike Wiley is a 89 y.o. male. They present today with a chief complaint of Fall and skin tear (Fell and skin tear left upper arm).    History of Present Illness  HPI    Past Medical History  Allergies as of 09/10/2024    (No Known Allergies)       (Not in a hospital admission)       Past Medical History:   Diagnosis Date    Heart valve disease     Hypertension     Myocardial infarction (Multi)     Personal history of other diseases of the circulatory system 06/16/2020    History of aortic valve stenosis    Personal history of other diseases of the musculoskeletal system and connective tissue     History of arthritis       Past Surgical History:   Procedure Laterality Date    AORTIC VALVE REPLACEMENT      CT ABDOMEN PELVIS ANGIOGRAM W AND/OR WO IV CONTRAST  06/24/2020    CT ABDOMEN PELVIS ANGIOGRAM W AND/OR WO IV CONTRAST 6/24/2020 [facility] ANCILLARY LEGACY    CT ANGIO NECK  07/21/2020    CT NECK ANGIO W AND WO IV CONTRAST 7/21/2020 [facility] ANCILLARY LEGACY    CT ANGIO NECK  11/21/2018    CT NECK ANGIO W AND WO IV CONTRAST 11/21/2018 [facility]CILL[facility] LEGAC[facility]    HERNIA REPAIR  07/26/2017    Hernia Repair    OTHER SURGICAL HISTORY  07/26/2017    Curettage Of Carpal Bones        reports that he has been smoking pipe. He has never used smokeless tobacco. He reports that he does not drink alcohol and does not use drugs.    Review of Systems  Review of Systems   Constitutional: Negative.    Skin:  Positive for wound.   Neurological:  Positive for dizziness.                                  Objective    Vitals:    09/10/24 1058   BP: 111/66   BP Location: Right arm   Patient Position: Sitting   Pulse: 77   Resp: 16   Temp: 36.7 °C (98.1 °F)   TempSrc: Oral   SpO2: (!) 89%   Weight: 88.5 kg (195 lb)   Height: 1.778 m (5' 10\")     No LMP for male patient.    Physical Exam  Constitutional:       Appearance: Normal appearance.   Skin:     General: Skin is warm.      Findings: Wound present.             Comments: " A  12x12 cm avusion laceration on left arm and a 2x2 cm flap on left forarm   Neurological:      Mental Status: He is alert.         Laceration Repair    Date/Time: 9/11/2024 8:30 AM    Performed by: Ariela Campuzano MD  Authorized by: Ariela Campuzano MD    Consent:     Consent given by:  Patient    Risks discussed:  Infection and need for additional repair  Universal protocol:     Patient identity confirmed:  Verbally with patient  Anesthesia:     Anesthesia method:  None  Laceration details:     Location:  Shoulder/arm    Shoulder/arm location:  L upper arm    Length (cm):  12    Depth (mm):  4  Pre-procedure details:     Preparation:  Patient was prepped and draped in usual sterile fashion  Exploration:     Limited defect created (wound extended): no      Hemostasis achieved with:  Direct pressure    Imaging outcome: foreign body not noted      Wound exploration: wound explored through full range of motion      Contaminated: no    Treatment:     Area cleansed with:  Povidone-iodine and saline    Amount of cleaning:  Standard    Irrigation solution:  Sterile saline    Irrigation method:  Tap    Visualized foreign bodies/material removed: no      Debridement:  None    Undermining:  None    Scar revision: no    Repair type:     Repair type:  Simple  Post-procedure details:     Dressing:  Sterile dressing, non-adherent dressing, antibiotic ointment and bulky dressing    Procedure completion:  Tolerated well, no immediate complications  Laceration Repair    Date/Time: 9/11/2024 8:38 AM    Performed by: Ariela Campuzano MD  Authorized by: Ariela Campuzano MD    Consent:     Consent obtained:  Verbal    Consent given by:  Patient    Risks, benefits, and alternatives were discussed: yes      Risks discussed:  Infection  Universal protocol:     Procedure explained and questions answered to patient or proxy's satisfaction: yes      Relevant documents present and verified: yes      Patient identity confirmed:  Verbally  with patient  Anesthesia:     Anesthesia method:  None  Laceration details:     Location:  Shoulder/arm    Shoulder/arm location:  L lower arm    Length (cm):  2    Depth (mm):  4  Pre-procedure details:     Preparation:  Patient was prepped and draped in usual sterile fashion  Exploration:     Limited defect created (wound extended): no      Hemostasis achieved with:  Direct pressure    Imaging outcome: foreign body not noted      Contaminated: no    Treatment:     Area cleansed with:  Povidone-iodine    Amount of cleaning:  Standard    Visualized foreign bodies/material removed: no      Debridement:  None    Undermining:  None    Scar revision: no    Skin repair:     Repair method:  Tissue adhesive  Approximation:     Approximation:  Close  Repair type:     Repair type:  Simple  Post-procedure details:     Dressing:  Non-adherent dressing and sterile dressing    Procedure completion:  Tolerated well, no immediate complications      Point of Care Test & Imaging Results from this visit  Results for orders placed or performed in visit on 03/18/24   Prostate Specific Antigen   Result Value Ref Range    Prostate Specific AG 2.15 <=4.00 ng/mL     No results found.    Diagnostic study results (if any) were reviewed by Ariela Campuazno MD.    Assessment/Plan   Allergies, medications, history, and pertinent labs/EKGs/Imaging reviewed by Ariela Campuzano MD.     Medical Decision Making      Orders and Diagnoses  Diagnoses and all orders for this visit:  Laceration of left arm with complication, initial encounter      Medical Admin Record      Follow Up Instructions  No follow-ups on file.    Patient disposition: Home    Electronically signed by Ariela Campuzano MD  12:08 PM

## 2024-09-11 ASSESSMENT — ENCOUNTER SYMPTOMS
CONSTITUTIONAL NEGATIVE: 1
WOUND: 1
DIZZINESS: 1

## 2024-09-13 ENCOUNTER — APPOINTMENT (OUTPATIENT)
Dept: CARDIOLOGY | Facility: HOSPITAL | Age: 89
End: 2024-09-13
Payer: MEDICARE

## 2024-09-13 ENCOUNTER — OFFICE VISIT (OUTPATIENT)
Dept: URGENT CARE | Age: 89
End: 2024-09-13
Payer: MEDICARE

## 2024-09-13 VITALS
TEMPERATURE: 98.6 F | OXYGEN SATURATION: 93 % | DIASTOLIC BLOOD PRESSURE: 64 MMHG | WEIGHT: 195 LBS | RESPIRATION RATE: 16 BRPM | SYSTOLIC BLOOD PRESSURE: 108 MMHG | BODY MASS INDEX: 27.92 KG/M2 | HEART RATE: 75 BPM | HEIGHT: 70 IN

## 2024-09-13 DIAGNOSIS — S41.112D: Primary | ICD-10-CM

## 2024-09-13 RX ORDER — MUPIROCIN 20 MG/G
OINTMENT TOPICAL 3 TIMES DAILY
Qty: 22 G | Refills: 0 | Status: SHIPPED | OUTPATIENT
Start: 2024-09-13 | End: 2024-09-23

## 2024-09-13 ASSESSMENT — ENCOUNTER SYMPTOMS: WOUND: 1

## 2024-09-13 NOTE — PROGRESS NOTES
"Subjective   Patient ID: Mike Wiley is a 89 y.o. male. They present today with a chief complaint of Other (Left  arm  wound ).    History of Present Illness  Pt presents for L. Arm wound x 3 days. Pt was seen here at the  when it happened 3 days ago. Pt's daughter requesting to change the wound dressing because pt's appointment at the wound clinic in on 9/18          Past Medical History  Allergies as of 09/13/2024    (No Known Allergies)       (Not in a hospital admission)       Past Medical History:   Diagnosis Date    Heart valve disease     Hypertension     Myocardial infarction (Multi)     Personal history of other diseases of the circulatory system 06/16/2020    History of aortic valve stenosis    Personal history of other diseases of the musculoskeletal system and connective tissue     History of arthritis       Past Surgical History:   Procedure Laterality Date    AORTIC VALVE REPLACEMENT      CT ABDOMEN PELVIS ANGIOGRAM W AND/OR WO IV CONTRAST  06/24/2020    CT ABDOMEN PELVIS ANGIOGRAM W AND/OR WO IV CONTRAST 6/24/2020 [facility] ANCILLARY LEGACY    CT ANGIO NECK  07/21/2020    CT NECK ANGIO W AND WO IV CONTRAST 7/21/2020 [facility]CILLARY LEGAC[facility]    CT ANGIO NECK  11/21/2018    CT NECK ANGIO W AND WO IV CONTRAST 11/21/2018 [facility]CILL[facility]GAC[facility]    HERNIA REPAIR  07/26/2017    Hernia Repair    OTHER SURGICAL HISTORY  07/26/2017    Curettage Of Carpal Bones        reports that he has been smoking pipe. He has never used smokeless tobacco. He reports that he does not drink alcohol and does not use drugs.    Review of Systems  Review of Systems   Skin:  Positive for wound.                                  Objective    Vitals:    09/13/24 1317   BP: 108/64   BP Location: Right leg   Patient Position: Sitting   BP Cuff Size: Adult   Pulse: 75   Resp: 16   Temp: 37 °C (98.6 °F)   TempSrc: Oral   SpO2: 93%   Weight: 88.5 kg (195 lb)   Height: 1.778 m (5' 10\")     No LMP for male patient.    Physical Exam  HENT:      Head: " Normocephalic and atraumatic.      Nose: Nose normal.      Mouth/Throat:      Mouth: Mucous membranes are moist.   Eyes:      Extraocular Movements: Extraocular movements intact.      Conjunctiva/sclera: Conjunctivae normal.      Pupils: Pupils are equal, round, and reactive to light.   Cardiovascular:      Rate and Rhythm: Normal rate and regular rhythm.   Pulmonary:      Effort: Pulmonary effort is normal.      Breath sounds: Normal breath sounds.   Skin:     General: Skin is warm.      Findings: Laceration present.             Comments: Extensive wound on upper L. Arm. No skin intact. Actively bleeding. Wound dressing is dry and stuck to the wound.    Neurological:      Mental Status: He is alert and oriented to person, place, and time.   Psychiatric:         Mood and Affect: Mood normal.         Behavior: Behavior normal.         Wound Care    Date/Time: 9/13/2024 2:03 PM    Performed by: Trisha Haque PA-C  Authorized by: Trisha Haque PA-C    Consent:     Consent obtained:  Verbal    Consent given by:  Patient    Risks, benefits, and alternatives were discussed: yes      Risks discussed:  Bleeding and infection    Alternatives discussed:  No treatment  Universal protocol:     Patient identity confirmed:  Verbally with patient  Sedation:     Sedation type:  None  Anesthesia:     Anesthesia method:  None  Procedure details:     Indications: open wounds      Wound location:  Arm    Shoulder/arm location:  L shoulder    Wound age (days):  3    Wound surface area (sq cm):  20    Debridement performed: No    Dressing:     Dressing applied:  Gelfoam large and Telfa pad    Wrapped with:  Elastic bandage 4 inch and Coban 2 inch  Post-procedure details:     Procedure completion:  Tolerated well, no immediate complications      Point of Care Test & Imaging Results from this visit  No results found for this visit on 09/13/24.   No results found.    Diagnostic study results (if any) were reviewed by Trisha Haque  ALBINA.    Assessment/Plan   Allergies, medications, history, and pertinent labs/EKGs/Imaging reviewed by Trisha Haque PA-C.     Medical Decision Making  Educated pt and his daughter about wound care. Recommended to change wound dressing daily. TD was given to pt today.    Orders and Diagnoses  There are no diagnoses linked to this encounter.    Medical Admin Record      Follow Up Instructions  No follow-ups on file.    Patient disposition: Home    Electronically signed by Trisha Haque PA-C  1:57 PM

## 2024-09-18 ENCOUNTER — OFFICE VISIT (OUTPATIENT)
Dept: WOUND CARE | Facility: HOSPITAL | Age: 89
End: 2024-09-18
Payer: MEDICARE

## 2024-09-18 DIAGNOSIS — S41.112A LACERATION OF LEFT ARM WITH COMPLICATION, INITIAL ENCOUNTER: ICD-10-CM

## 2024-09-18 PROCEDURE — 99213 OFFICE O/P EST LOW 20 MIN: CPT

## 2024-09-20 ENCOUNTER — TELEMEDICINE (OUTPATIENT)
Dept: CARDIOLOGY | Facility: HOSPITAL | Age: 89
End: 2024-09-20
Payer: MEDICARE

## 2024-09-20 DIAGNOSIS — I65.23 BILATERAL CAROTID ARTERY STENOSIS: Primary | ICD-10-CM

## 2024-09-20 NOTE — PROGRESS NOTES
Primary Care Physician: Minal Salguero MD   Date of Visit: 09/20/2024  9:45 AM EDT  Type of Visit: New patient    Chief Complaint:  Carotid artery stenosis    HPI  Mike Wiley 89 y.o. male who presents to establish care.    Doing well: He denies any strokes or amaurosis fugax.    Review of Systems   12 point review of systems was obtained in detail and is negative other than that noted above or below.     Past Medical/Surgical History:  Mike has a past medical history of Heart valve disease, Hypertension, Myocardial infarction (Multi), Personal history of other diseases of the circulatory system (06/16/2020), and Personal history of other diseases of the musculoskeletal system and connective tissue.    Mike has a past surgical history that includes Other surgical history (07/26/2017); Hernia repair (07/26/2017); CT angio abdomen pelvis w and or wo IV IV contrast (06/24/2020); CT angio neck (07/21/2020); CT angio neck (11/21/2018); and Aortic valve replacement.    Social/Family History:  Mike reports that he has been smoking pipe. He has never used smokeless tobacco. He reports that he does not drink alcohol and does not use drugs.    Mike's family history includes Esophageal cancer in his brother; Stroke in his father; cardiac disorder in his mother.    Allergies:  No Known Allergies    Medications:  Current Outpatient Medications   Medication Sig Dispense Refill    aspirin 81 mg EC tablet Take 1 tablet (81 mg) by mouth once daily.      atorvastatin (Lipitor) 20 mg tablet Take 1 tablet (20 mg) by mouth once daily. 90 tablet 3    clopidogrel (Plavix) 75 mg tablet Take 1 tablet (75 mg) by mouth once daily.      hydroCHLOROthiazide (Microzide) 12.5 mg capsule Take 1 capsule (12.5 mg) by mouth once daily in the morning.      lisinopril 5 mg tablet TAKE 1 TABLET ONE TIME DAILY (DOSE DECREASE)      mupirocin (Bactroban) 2 % ointment Apply topically 3 times a day for 10 days. 22 g 0    solifenacin  "(VESIcare) 10 mg tablet Take 1 tablet (10 mg) by mouth once daily. Swallow tablet whole; do not crush, chew, or split. 30 tablet 11    tamsulosin (Flomax) 0.4 mg 24 hr capsule Take 2 capsules (0.8 mg) by mouth once daily. 60 capsule 11    Ventolin HFA 90 mcg/actuation inhaler        No current facility-administered medications for this visit.       Objective:   There were no vitals filed for this visit.    Televisit    Labs and Imaging:      Latest Ref Rng & Units 4/2/2020     1:01 PM 4/7/2020     2:05 PM 6/12/2020     7:28 AM 7/7/2020     5:17 PM 7/8/2020     4:08 AM 7/13/2020    10:51 AM 10/19/2020     3:07 PM   Electrolytes   Na 136 - 145 mmol/L 140   137  139  137  139  138    K 3.5 - 5.3 mmol/L 5.5  4.8  4.2  4.3  4.4  5.1  5.0    Cl 98 - 107 mmol/L 102   101  106  104  102  102    CO2 21 - 32 mmol/L 31   26  25  25  29  30    Cr 0.50 - 1.30 mg/dL 1.13   1.33  0.96  0.91  1.02  1.09    Ca 8.6 - 10.6 mg/dL 9.2   9.5  8.7  8.6  9.3  9.3    Phos 2.5 - 4.9 mg/dL 3.4     2.9            Latest Ref Rng & Units 11/20/2018     2:46 PM 5/16/2019    12:12 PM 11/4/2019    12:55 PM 6/12/2020     7:28 AM 7/7/2020     5:17 PM 7/8/2020     4:08 AM 7/13/2020    10:51 AM   CBC   Hb 13.5 - 17.5 g/dL 13.3  13.5  14.1  15.1  13.5  13.3  12.6    Hct 41.0 - 52.0 % 40.5  40.8  42.9  47.0  39.6  38.4  39.9    MCV 80 - 100 fL 90  91  90  93  88  88  96    RDW 11.5 - 14.5 % 12.6  12.4  13.0  12.4  12.7  12.4  12.9          Latest Ref Rng & Units 12/7/2017    12:27 PM 4/9/2018    12:43 PM 8/10/2018     7:14 PM 8/14/2018     2:10 PM 11/20/2018     2:46 PM 5/16/2019    12:12 PM 11/4/2019    12:55 PM   Lipids   ALT 10 - 52 U/L 20  21  19  27  17  16  27    AST 9 - 39 U/L 17  18  15  32  16  16  23          Latest Ref Rng & Units 2/13/2019     3:19 PM 3/2/2020     1:49 PM   Thyroid   TSH 0.44 - 3.98 mIU/L 1.22  2.26           No data to display                 No results found for: \"HGBA1C\", \"ALBUR\"     The ASCVD Risk score (Helena DK, et " al., 2019) failed to calculate for the following reasons:    The 2019 ASCVD risk score is only valid for ages 40 to 79    The patient has a prior MI or stroke diagnosis     Echocardiogram: No results found for this or any previous visit.     Echocardiogram:     PHYSICIAN INTERPRETATION:  Left Ventricle: The left ventricular systolic function is normal. There are no regional wall motion abnormalities. The left ventricular cavity size is normal. Abnormal (paradoxical) septal motion, consistent with left bundle branch block. Spectral Doppler shows an impaired relaxation pattern of left ventricular diastolic filling.  Left Atrium: The left atrium is normal in size.  Right Ventricle: The right ventricle is normal in size. There is normal right ventricular global systolic function.  Right Atrium: The right atrium is mildly dilated.  Aortic Valve: The aortic valve appears abnormal. There is a transcatheter aortic valve replacement. There is trivial aortic valve regurgitation. The peak instantaneous gradient of the aortic valve is 25.0 mmHg. The mean gradient of the aortic valve is 13.9 mmHg. There is a Spaien 26 3 ultra TAVR. There is trival periprosthetic regurgitation. Peak gradient 24, mean 13mmHg.  Mitral Valve: The mitral valve is mildly thickened. There is trace mitral valve regurgitation.  Tricuspid Valve: The tricuspid valve is structurally normal. There is mild tricuspid regurgitation.  Pulmonic Valve: The pulmonic valve is structurally normal. There is trace pulmonic valve regurgitation.  Pericardium: There is a trivial pericardial effusion.  Aorta: The aortic root is abnormal. There is mild dilatation of the ascending aorta. The aortic root is at the upper limits of normal size.  Pulmonary Artery: The pulmonary artery is not well visualized. The tricuspid regurgitant velocity is 2.49 m/s, and with an estimated right atrial pressure of 8 mmHg, the estimated pulmonary artery pressure is borderline elevated with  the RVSP at 32.8 mmHg.  Systemic Veins: The inferior vena cava was not well visualized.  In comparison to the previous echocardiogram(s): Compared with study from 9/11/2020,. No signficant change.      CONCLUSIONS:  1. The left ventricular systolic function is normal.  2. Abnormal septal motion consistent with left bundle branch block.  3. Spectral Doppler shows an impaired relaxation pattern of left ventricular diastolic filling.  4. There is a Spaien 26 3 ultra TAVR. There is trival periprosthetic regurgitation. Peak gradient 24, mean 13mmHg.  5. There is a transcatheter aortic valve replacement.  6. The pulmonary artery is not well visualized.    Stress Testing: No results found for this or any previous visit from the past 1825 days.    Cardiac Catheterization:   ADULT CATH     Narrative  Ordered by an unspecified provider.    Cardiac Scoring: No results found for this or any previous visit from the past 1825 days.    AAA : No results found for this or any previous visit from the past 1825 days.    OTHER: No results found for this or any previous visit from the past 1825 days.        Assessment/Plan:   No diagnosis found.     Mike Wiley 89 y.o. male who presents establish care:    1.  Bilateral carotid artery stenosis  2.  Severe arctic stenosis status post SARAH 26 ultra transcatheter aortic valve placement        Discussed the risk, benefits, alternatives of revascularization versus medical therapy.  He has discussed this with his family and at this point, wishes to just pursue medical management.     ____________________________________________________________  Mal Travis MD

## 2024-09-25 ENCOUNTER — OFFICE VISIT (OUTPATIENT)
Dept: WOUND CARE | Facility: HOSPITAL | Age: 89
End: 2024-09-25
Payer: MEDICARE

## 2024-09-25 PROCEDURE — 11045 DBRDMT SUBQ TISS EACH ADDL: CPT

## 2024-09-25 PROCEDURE — 11042 DBRDMT SUBQ TIS 1ST 20SQCM/<: CPT

## 2024-10-02 ENCOUNTER — OFFICE VISIT (OUTPATIENT)
Dept: WOUND CARE | Facility: HOSPITAL | Age: 89
End: 2024-10-02
Payer: MEDICARE

## 2024-10-02 PROCEDURE — 99212 OFFICE O/P EST SF 10 MIN: CPT

## 2024-10-28 DIAGNOSIS — I65.21 MORE THAN 50 PERCENT STENOSIS OF RIGHT INTERNAL CAROTID ARTERY: ICD-10-CM

## 2024-10-28 RX ORDER — ATORVASTATIN CALCIUM 20 MG/1
20 TABLET, FILM COATED ORAL DAILY
Qty: 90 TABLET | Refills: 3 | Status: SHIPPED | OUTPATIENT
Start: 2024-10-28 | End: 2025-10-28

## 2024-11-14 NOTE — PROGRESS NOTES
Subjective   Patient ID: Mike Wiley is a 89 y.o. male who presents for fuv.     HPI  Mike Wiley is a 89 y.o. male patient with history of [place] 7 Prostate cancer. Clinical T2cNx, which he completed definitive curative intent radiation in March 2018 with 6 months of anti androgen therapy. F/U BPH and PSA. Recent PSA was 3.1.     Was prescribed solifenacin 10mg, has been feeling dizzy since.     Review of Systems  A complete review of systems was performed. All systems are noted to be negative unless indicated in the history of present illness, impression, active problem list, or past histories.      Objective   Physical Exam    Bladder scan: PVR = 300 mL    Lab Results   Component Value Date    PSA 3.16 11/15/2024    PSA 2.15 03/18/2024    PSA 3.06 08/29/2023    PSA 2.43 02/21/2023    PSA 3.19 10/27/2022         Assessment/Plan   Diagnoses and all orders for this visit:  Malignant neoplasm of prostate (Multi)  -     Prostate Specific Antigen; Future  Overactive bladder      Mike Wiley is a 89 y.o. male patient with history of [place] 7 Prostate cancer. Clinical T2cNx, which he completed definitive curative intent radiation in March 2018 with 6 months of anti androgen therapy. F/U BPH and PSA. Recent PSA was 3.1. Given his age, I am not too concerned about his current PSA. I recommend re-checking PSA in 4 months, if this continues to rise, will discuss further options then.     BPH with LUTS, on tamsulosin, 2 pills daily and Vesicare 10mg. Would like to discontinue Vesicare 10mg, due to it's side effects such as dizziness. We will switch it with Gemtesa. Urinary symptoms are the same, no significant changes notes on Vesicare 10mg.     Bladder scan shows post void residue of 317ml today. Advised to stop Vesicare 10mg. Discussed he may need to self-catheterize, but given his poor hand coordination it may be difficult.     Plan:   PSA in 4 months.  Stop Solifenacin  Follow up in 2 weeks to repeat bladder  scan      Scribe Attestation  By signing my name below, I, Barrett Hein   attest that this documentation has been prepared under the direction and in the presence of Edith Hearn MD.      Scribe Attestation  By signing my name below, I, Barby Barrett Barrios, attest that this documentation has been prepared under the direction and in the presence of Edith Hearn MD. ,

## 2024-11-15 ENCOUNTER — LAB (OUTPATIENT)
Dept: LAB | Facility: LAB | Age: 89
End: 2024-11-15
Payer: MEDICARE

## 2024-11-15 DIAGNOSIS — Z85.46 HISTORY OF PROSTATE CANCER: ICD-10-CM

## 2024-11-15 LAB — PSA SERPL-MCNC: 3.16 NG/ML

## 2024-11-18 ENCOUNTER — APPOINTMENT (OUTPATIENT)
Dept: UROLOGY | Facility: CLINIC | Age: 89
End: 2024-11-18
Payer: MEDICARE

## 2024-11-18 DIAGNOSIS — C61 MALIGNANT NEOPLASM OF PROSTATE (MULTI): Primary | ICD-10-CM

## 2024-11-18 DIAGNOSIS — N32.81 OVERACTIVE BLADDER: ICD-10-CM

## 2024-12-03 NOTE — PROGRESS NOTES
Subjective   Patient ID: Mike Wiley is a 89 y.o. male who is returns for a repeat bladder scan. During his last visit on 11/18/2024, his PVR was 317ml. He was advised to discontinue the Vesicare 10 mg.        HPI  89 y.o. male presents with history of [place] 7 Prostate cancer. Clinical T2cNx, which he completed definitive curative intent radiation in March 2018 with 6 months of anti androgen therapy. F/U BPH and PSA. Recent PSA was 3.1.     He is experiencing less episodes of urination since discontinuing the Vesicare.     Lab Results   Component Value Date    PSA 3.16 11/15/2024    PSA 2.15 03/18/2024    PSA 3.06 08/29/2023    PSA 2.43 02/21/2023    PSA 3.19 10/27/2022        Review of Systems  A complete review of systems was performed. All systems are noted to be negative unless indicated in the history of present illness, impression, active problem list, or past histories.     Objective   Physical Exam  PVR: 180    Assessment/Plan   Diagnoses and all orders for this visit:  Benign prostatic hyperplasia with incomplete bladder emptying  Malignant neoplasm of prostate (Multi)  -     Prostate Specific Antigen; Future  -     NM PET CT prostate PSMA; Future      Mike Wiley is a 89 y.o. male who is returns for a repeat bladder scan. During his last visit on 11/18/2024, his PVR was 317ml. He was advised to stop Vesicare 10 mg.      The PVR has decreased from 317 ml last visit to 180 ml today. As the patient has discontinued Vesicare 10 mg and his symptoms have improved, I would like to continue to discontinue the mediation.     His PSA continues to rise however doubling time nearly 1 year, I have advised the patient that this is not an imment threat at this time. His cancer remains active but we want to keep him comfortable and avoid any treatment unless necessary.   I do not feel the urge to treat the patient at this time. As the side effects could be greater than the effectiveness of the medication. In 1 year,  I would like to order another PSA value. I would like to order a PET CT Prostate in 05/2025 due to his recurrent prostate cancer.     Plan:  Discontinue Vesicare 10 mg   PSA in 5/2025  PET CT Prostate 05/2025 for recurrent prostate cancer      Scribe Attestation   By signing my name below, I, Courtney Mak, [place] attestation that this documentation has been prepared under the direction and in the presence of Edith Hearn MD.

## 2024-12-04 ENCOUNTER — APPOINTMENT (OUTPATIENT)
Dept: UROLOGY | Facility: CLINIC | Age: 89
End: 2024-12-04
Payer: MEDICARE

## 2024-12-04 DIAGNOSIS — R39.14 BENIGN PROSTATIC HYPERPLASIA WITH INCOMPLETE BLADDER EMPTYING: Primary | ICD-10-CM

## 2024-12-04 DIAGNOSIS — N40.1 BENIGN PROSTATIC HYPERPLASIA WITH INCOMPLETE BLADDER EMPTYING: Primary | ICD-10-CM

## 2024-12-04 DIAGNOSIS — C61 MALIGNANT NEOPLASM OF PROSTATE (MULTI): ICD-10-CM

## 2025-01-21 DIAGNOSIS — C61 MALIGNANT NEOPLASM OF PROSTATE (MULTI): ICD-10-CM

## 2025-04-03 DIAGNOSIS — N40.1 BENIGN PROSTATIC HYPERPLASIA WITH INCOMPLETE BLADDER EMPTYING: ICD-10-CM

## 2025-04-03 DIAGNOSIS — R39.14 BENIGN PROSTATIC HYPERPLASIA WITH INCOMPLETE BLADDER EMPTYING: ICD-10-CM

## 2025-04-03 RX ORDER — TAMSULOSIN HYDROCHLORIDE 0.4 MG/1
2 CAPSULE ORAL
COMMUNITY
Start: 2025-03-06 | End: 2025-04-03 | Stop reason: SDUPTHER

## 2025-04-04 RX ORDER — TAMSULOSIN HYDROCHLORIDE 0.4 MG/1
0.8 CAPSULE ORAL
Qty: 60 CAPSULE | Refills: 11 | Status: SHIPPED | OUTPATIENT
Start: 2025-04-04 | End: 2026-04-04

## 2025-05-05 ENCOUNTER — HOSPITAL ENCOUNTER (OUTPATIENT)
Dept: RADIOLOGY | Facility: HOSPITAL | Age: OVER 89
End: 2025-05-05
Payer: MEDICARE

## 2025-05-05 ENCOUNTER — APPOINTMENT (OUTPATIENT)
Dept: RADIOLOGY | Facility: HOSPITAL | Age: OVER 89
End: 2025-05-05
Payer: MEDICARE

## 2025-05-07 ENCOUNTER — HOSPITAL ENCOUNTER (OUTPATIENT)
Dept: RADIOLOGY | Facility: HOSPITAL | Age: OVER 89
Discharge: HOME | End: 2025-05-07
Payer: MEDICARE

## 2025-05-07 DIAGNOSIS — C61 MALIGNANT NEOPLASM OF PROSTATE (MULTI): ICD-10-CM

## 2025-05-07 PROCEDURE — A9596 HC RX 343 DIAGNOSTIC RADIOPHARMACEUTICALS: HCPCS | Performed by: STUDENT IN AN ORGANIZED HEALTH CARE EDUCATION/TRAINING PROGRAM

## 2025-05-07 PROCEDURE — 78815 PET IMAGE W/CT SKULL-THIGH: CPT | Mod: PS

## 2025-05-07 PROCEDURE — 3430000001 HC RX 343 DIAGNOSTIC RADIOPHARMACEUTICALS: Performed by: STUDENT IN AN ORGANIZED HEALTH CARE EDUCATION/TRAINING PROGRAM

## 2025-05-07 RX ADMIN — KIT FOR THE PREPARATION OF GALLIUM GA 68 GOZETOTIDE INJECTION 5.6 MILLICURIE: KIT INTRAVENOUS at 12:52

## 2025-05-28 ENCOUNTER — APPOINTMENT (OUTPATIENT)
Dept: UROLOGY | Facility: CLINIC | Age: OVER 89
End: 2025-05-28
Payer: MEDICARE

## 2025-05-31 LAB — PSA SERPL-MCNC: 2.91 NG/ML

## 2025-06-01 NOTE — PROGRESS NOTES
Subjective   Patient ID: Mike Wiley is a 89 y.o. male.      HPI  89 y.o. male presents for a follow up visit regarding a Randy score 4+3=7, GG3 prostate cancer, that was diagnosed in 2017. He completed completed definitive curative intent radiation in March 2018 with 6 months of anti androgen therapy. Recent PSA level 3.1. He returns for the NM PET CT Prostate PSMA scan results that was ordered and completed.     He states that he is experiencing nocturia. He states that last night he awoke at 3 am and was awake each hour until 6 am with urinary frequency.     He is not experiencing any side effects of medication or radiation. Previously, when he was on Vesicare he experienced dizziness.     He completes 50% of his daily activity of living independently.     He is using a cane today but typically uses a walker.       Lab Results   Component Value Date    PSA 2.91 05/30/2025    PSA 3.16 11/15/2024    PSA 2.15 03/18/2024    PSA 3.06 08/29/2023    PSA 2.43 02/21/2023         NM PET CT PROSTATE PSMA; 5/7/2025  IMPRESSION:  1. Similar few F18 PSMA avid foci in the prostate gland likely  representing recurrent prostate cancer.  2. There is no evidence for F-18 PSMA-avid pelvic lymph node  metastasis.  3. There is no evidence for F-18 PSMA-avid distant metastatic disease.    Surgical Pathology Exam collected on 07/18/2017:       1. PROSTATE, RIGHT APEX, NEEDLE CORE BIOPSY:       ADENOCARCINOMA OF PROSTATE, RANDY SCORE 3+3 = 6, INVOLVING 2 OF 2  CORE            BIOPSIES AND REPRESENTING APPROXIMATELY 90% OF THE TISSUE,            15 MM IN LENGTH.    2. PROSTATE, RIGHT LATERAL, NEEDLE CORE BIOPSY:       ADENOCARCINOMA OF PROSTATE, RANDY SCORE 4+3 =7, INVOLVING 2 OF 2  CORE            BIOPSIES AND REPRESENTING APPROXIMATELY 90% OF THE TISSUE,            FOCAL PERINEURAL INVASION PRESENT, 15 MM IN LENGTH.    3. PROSTATE, RIGHT BASE, NEEDLE CORE BIOPSY:       ADENOCARCINOMA OF PROSTATE, RANDY SCORE 4+3 = 7, INVOLVING  2 OF 2  CORE            BIOPSIES AND REPRESENTING APPROXIMATELY 60% OF THE TISSUE,            11 MM IN LENGTH.    4. PROSTATE, RIGHT MEDIAN, NEEDLE CORE BIOPSY:       ADENOCARCINOMA OF PROSTATE, RANDY SCORE 3+3 = 6, INVOLVING 2 OF 2  CORE            NEEDLE BIOPSIES AND REPRESENTING APPROXIMATELY 10% OF THE  TISSUE,            2 MM IN LENGTH.    5. PROSTATE, LEFT APEX, NEEDLE CORE BIOPSY:       ADENOCARCINOMA OF PROSTATE, RANDY SCORE 3+4 = 7, INVOLVING 1 OF 2  CORE            BIOPSIES AND REPRESENTING APPROXIMATELY 25% OF THE TISSUE,            7 MM IN LENGTH.    6. PROSTATE, LEFT LATERAL, NEEDLE CORE BIOPSY:       PROSTATE TISSUE, NO EVIDENCE OF MALIGNANCY.    7. PROSTATE, LEFT BASE, NEEDLE CORE BIOPSY:       PROSTATE TISSUE, NO EVIDENCE OF MALIGNANCY.    8. PROSTATE, LEFT MEDIAN, NEEDLE CORE BIOPSY:       PROSTATE TISSUE, NO EVIDENCE OF MALIGNANCY.    Comment: Prognostic grade group 3.       Review of Systems  A complete review of systems was performed. All systems are noted to be negative unless indicated in the history of present illness, impression, active problem list, or past histories.     Objective   Physical Exam  Using a cane for ambulation   DESI: prostate enlarged, hard prostate on the right side, no evidence of nodules   ECOG status 2    Assessment/Plan   Diagnoses and all orders for this visit:  Benign prostatic hyperplasia with incomplete bladder emptying  -     vibegron (Gemtesa) 75 mg tablet; Take 1 tablet (75 mg) by mouth once daily. [id number]  5/26  Malignant neoplasm of prostate (Multi)  -     Prostate Specific Antigen; Future      89 y.o. male presents for a follow up visit regarding a Randy score 4+3=7, GG3 prostate cancer, that was diagnosed in 2017. He completed completed definitive curative intent radiation in March 2018 with 6 months of anti androgen therapy. Recent PSA level 3.1. He returns for the NM PET CT Prostate PSMA scan results that was ordered and completed.     I personally  reviewed the NM PET CT Prostate PSMA scan that was obtained on 05/07/2025. The imaging details similar F18 PSMA avid foci in the prostate gland. This is likely representing recurrent prostate cancer. The patient's imaging details no evidence of lymph node metastasis in the pelvis.     My recommendation would be for him to not undergo any greater treatment. I would recommend that he obtain a PSA level every 3 months to monitor the trend and for surveillance purposes.     Due to the patient's urinary frequency, I would recommend that the patient be prescribed Gemtesa 75 mg daily. Mechanism of action, risks, benefits, alternatives and side effects reviewed. He would like to proceed with starting the medication. Samples were given to the patient to start with.     Plan:  PSA ordered for 08/2025, every 3 months for prostate cancer surveillance  Start Gemtesa 75 mg daily   FUV with urology in 09/2025      Scribe Attestation   By signing my name below, Courtney LOWE, Scribe attestation that this documentation has been prepared under the direction and in the presence of Edith Hearn MD.

## 2025-06-04 ENCOUNTER — APPOINTMENT (OUTPATIENT)
Dept: UROLOGY | Facility: CLINIC | Age: OVER 89
End: 2025-06-04
Payer: MEDICARE

## 2025-06-04 DIAGNOSIS — C61 MALIGNANT NEOPLASM OF PROSTATE (MULTI): ICD-10-CM

## 2025-06-04 DIAGNOSIS — N40.1 BENIGN PROSTATIC HYPERPLASIA WITH INCOMPLETE BLADDER EMPTYING: ICD-10-CM

## 2025-06-04 DIAGNOSIS — R39.14 BENIGN PROSTATIC HYPERPLASIA WITH INCOMPLETE BLADDER EMPTYING: ICD-10-CM

## 2025-06-04 RX ORDER — VIBEGRON 75 MG/1
75 TABLET, FILM COATED ORAL DAILY
Qty: 45 TABLET | Refills: 0 | COMMUNITY
Start: 2025-06-04 | End: 2025-07-19

## 2025-06-04 ASSESSMENT — PAIN SCALES - GENERAL: PAINLEVEL_OUTOF10: 0-NO PAIN

## 2025-06-05 ENCOUNTER — APPOINTMENT (OUTPATIENT)
Dept: UROLOGY | Facility: HOSPITAL | Age: OVER 89
End: 2025-06-05
Payer: MEDICARE

## 2025-07-09 ENCOUNTER — OFFICE VISIT (OUTPATIENT)
Dept: CARDIOLOGY | Facility: HOSPITAL | Age: OVER 89
End: 2025-07-09
Payer: MEDICARE

## 2025-07-09 VITALS
OXYGEN SATURATION: 94 % | DIASTOLIC BLOOD PRESSURE: 66 MMHG | SYSTOLIC BLOOD PRESSURE: 156 MMHG | WEIGHT: 193.12 LBS | BODY MASS INDEX: 27.71 KG/M2 | HEART RATE: 63 BPM

## 2025-07-09 DIAGNOSIS — I65.21 MORE THAN 50 PERCENT STENOSIS OF RIGHT INTERNAL CAROTID ARTERY: ICD-10-CM

## 2025-07-09 DIAGNOSIS — I10 HYPERTENSION, UNSPECIFIED TYPE: ICD-10-CM

## 2025-07-09 DIAGNOSIS — I35.0 NONRHEUMATIC AORTIC (VALVE) STENOSIS: Primary | ICD-10-CM

## 2025-07-09 LAB
ATRIAL RATE: 62 BPM
P AXIS: 6 DEGREES
P OFFSET: 177 MS
P ONSET: 136 MS
PR INTERVAL: 156 MS
Q ONSET: 214 MS
QRS COUNT: 10 BEATS
QRS DURATION: 114 MS
QT INTERVAL: 390 MS
QTC CALCULATION(BAZETT): 395 MS
QTC FREDERICIA: 394 MS
R AXIS: 29 DEGREES
T AXIS: -42 DEGREES
T OFFSET: 409 MS
VENTRICULAR RATE: 62 BPM

## 2025-07-09 PROCEDURE — 93005 ELECTROCARDIOGRAM TRACING: CPT | Performed by: NURSE PRACTITIONER

## 2025-07-09 RX ORDER — ATORVASTATIN CALCIUM 20 MG/1
20 TABLET, FILM COATED ORAL DAILY
Qty: 90 TABLET | Refills: 3 | Status: SHIPPED | OUTPATIENT
Start: 2025-07-09 | End: 2026-07-09

## 2025-07-09 RX ORDER — LISINOPRIL 5 MG/1
5 TABLET ORAL DAILY
Qty: 90 TABLET | Refills: 3 | Status: SHIPPED | OUTPATIENT
Start: 2025-07-09 | End: 2026-07-09

## 2025-07-09 ASSESSMENT — ENCOUNTER SYMPTOMS
RESPIRATORY NEGATIVE: 1
GASTROINTESTINAL NEGATIVE: 1
PSYCHIATRIC NEGATIVE: 1
EYES NEGATIVE: 1
NEUROLOGICAL NEGATIVE: 1
HEMATOLOGIC/LYMPHATIC NEGATIVE: 1
MYALGIAS: 1
ENDOCRINE NEGATIVE: 1
CARDIOVASCULAR NEGATIVE: 1

## 2025-07-09 NOTE — PATIENT INSTRUCTIONS
NO MEDICATION CHANGES   ECHOCARDIOGRAM   FOLLOW UP IN ONE YEAR   CALL WITH ANY QUESTIONS   WILL CALL TO REVIEW THE RESULTS

## 2025-07-09 NOTE — PROGRESS NOTES
Referred by Dr. Finnegan for No chief complaint on file.     History Of Present Illness:    Mike Cho is a very pleasant 89 year old gentleman with a history of aortic stenosis s/p TAVR (7/2020), he is here for a follow up visit. The patient is seen in collaboration with Dr. Haywood. Denies chest pain or shortness of breath. Occasional dizziness. States he likes to sit and read. . Wears compression stocking helps with swelling. Swelling has been minimal. Primary care stopped the water pills.     Review of Systems   Constitutional: Positive for malaise/fatigue.   HENT: Negative.     Eyes: Negative.    Cardiovascular: Negative.    Respiratory: Negative.     Endocrine: Negative.    Hematologic/Lymphatic: Negative.    Skin: Negative.    Musculoskeletal:  Positive for muscle weakness and myalgias.   Gastrointestinal: Negative.    Neurological: Negative.    Psychiatric/Behavioral: Negative.        Past Medical History:  He has a past medical history of Cancer (Multi), Heart valve disease, Hypertension, Myocardial infarction (Multi), Personal history of other diseases of the circulatory system (06/16/2020), and Personal history of other diseases of the musculoskeletal system and connective tissue.    Past Surgical History:  He has a past surgical history that includes Other surgical history (07/26/2017); Hernia repair (07/26/2017); CT angio abdomen pelvis w and or wo IV IV contrast (06/24/2020); CT angio neck (07/21/2020); CT angio neck (11/21/2018); and Aortic valve replacement.      Social History:  He reports that he has been smoking pipe. He has never used smokeless tobacco. He reports that he does not drink alcohol and does not use drugs.    Family History:  Family History   Problem Relation Name Age of Onset    Other (cardiac disorder) Mother      Stroke Father Leo cho     Esophageal cancer Brother          Allergies:  Patient has no known allergies.    Outpatient Medications:  Current Outpatient Medications  "  Medication Instructions    aspirin 81 mg EC tablet 1 tablet, Daily    atorvastatin (LIPITOR) 20 mg, oral, Daily    clopidogrel (PLAVIX) 75 mg, Daily    Gemtesa 75 mg, oral, Daily, [id number]  5/26    hydroCHLOROthiazide (Microzide) 12.5 mg capsule Take 1 capsule (12.5 mg) by mouth once daily in the morning.    lisinopril 5 mg tablet TAKE 1 TABLET ONE TIME DAILY (DOSE DECREASE)    tamsulosin (FLOMAX) 0.8 mg, oral, Daily (0630)    Ventolin HFA 90 mcg/actuation inhaler         Last Recorded Vitals:  Vitals:    07/09/25 1121   BP: 156/66   Pulse: 63   SpO2: 94%   Weight: 87.6 kg (193 lb 2 oz)         Physical Exam:  Physical Exam  Vitals reviewed.   HENT:      Head: Normocephalic.      Nose: Nose normal.   Eyes:      Pupils: Pupils are equal, round, and reactive to light.   Cardiovascular:      Rate and Rhythm: Normal rate and regular rhythm.   Pulmonary:      Effort: Pulmonary effort is normal.      Breath sounds: expiratory wheezing   Abdominal:      General: Abdomen is flat.      Palpations: Abdomen is soft.   Musculoskeletal:         General: Normal range of motion.      Cervical back: Normal range of motion.   Skin:     General: Skin is warm and dry.   Neurological:      General: No focal deficit present.      Mental Status: He is alert and oriented to person, place, and time.   Psychiatric:         Mood and Affect: Mood normal.     Neck supple no JVP + Bruit          Last Labs:  CBC -  Lab Results   Component Value Date    WBC 4.6 07/13/2020    HGB 12.6 (L) 07/13/2020    HCT 39.9 (L) 07/13/2020    MCV 96 07/13/2020     (L) 07/13/2020       CMP -  Lab Results   Component Value Date    CALCIUM 9.3 10/19/2020    PHOS 2.9 07/08/2020    PROT 7.3 11/04/2019    ALBUMIN 3.6 07/08/2020    ALBUMIN 4.1 08/10/2018    AST 23 11/04/2019    ALT 27 11/04/2019    ALKPHOS 63 11/04/2019    BILITOT 0.5 11/04/2019       LIPID PANEL -   No results found for: \"CHOL\", \"TRIG\", \"HDL\", \"CHHDL\", \"LDLF\", \"VLDL\", \"NHDL\"    RENAL FUNCTION " "PANEL -   Lab Results   Component Value Date    GLUCOSE 113 (H) 10/19/2020     10/19/2020    K 5.0 10/19/2020     10/19/2020    CO2 30 10/19/2020    ANIONGAP 11 10/19/2020    BUN 18 10/19/2020    CREATININE 1.09 10/19/2020    CALCIUM 9.3 10/19/2020    PHOS 2.9 07/08/2020    ALBUMIN 3.6 07/08/2020    ALBUMIN 4.1 08/10/2018        No results found for: \"BNP\", \"HGBA1C\"    Last Cardiology Tests:  ECG:  EKG independently reviewed from today showed sinus rhythm heart rate 62 bpm     Echo:  Echocardiogram 9/11/2021  1. The left ventricular systolic function is normal with a 60-65% estimated  ejection fraction.  2. Spectral Doppler shows an impaired relaxation pattern of left ventricular  diastolic filling.  3. There is a transcatheter aortic valve replacement.     Echocardiogram 7/8/2020   1. The left ventricular systolic function is normal with a 60% estimated  ejection fraction.  2. Spectral Doppler shows an impaired relaxation pattern of left ventricular  diastolic filling.  3. There is mild to moderate asymmetric left ventricular hypertrophy.  4. There is a transcatheter aortic valve replacement.    Echocardiogram 7/7/2020  1. The left ventricular systolic function is normal with a 60-65% estimated  ejection fraction.  2. The left atrium is enlarged.  3. RVSP within normal limits.  4. Severe aortic valve stenosis.  5. There is aortic valve annular calcification.  6. There is severe aortic valve cusp calcification.  7. There is severe aortic valve thickening.  8. There is mild aortic valve regurgitation.  9. The pulmonary artery is not well visualized    Echo 5/28/2020:  1. The left ventricular systolic function is normal with a 60% estimated  ejection fraction.  2. Spectral Doppler shows an impaired relaxation pattern of left ventricular  diastolic filling.  3. Moderate to severe aortic valve stenosis. The aortic valve dimensionless  index is 0.23. The peak instantaneous gradient of the aortic valve is 52.7 " Yes-Patient/Caregiver accepts free interpretation services... mmHg.  The mean gradient of the aortic valve is 33.0 mmHg.  4. There is mild to moderate aortic valve regurgitation.  5. There is mild mitral and tricuspid regurgitation.  6. The estimated pulmonary artery pressure is mildly elevated with the RVSP at  41.4 mmHg.     Echo 5/23/19:  1. The left ventricular systolic function is normal with a 60-65% estimated  ejection fraction.  2. Spectral Doppler shows an impaired relaxation pattern of left ventricular  diastolic filling.  3. Moderate to severe aortic valve stenosis. The aortic valve dimensionless  index is 0.23. There is mild aortic valve regurgitation. The peak instantaneous  gradient of the aortic valve is 47.6 mmHg. The mean gradient of the aortic valve  is 28.0 mmHg.  4. There is mild mitral, tricuspid, and pulmonic regurgitation.  5. The estimated pulmonary artery pressure is mildly elevated with the RVSP at  36.8 mmHg.     Echo 10/16/18:   1. The left ventricular systolic function is normal with a 55-60% estimated  ejection fraction.   2. Spectral Doppler shows an impaired relaxation pattern of left ventricular  diastolic filling.   3. There is mild to moderate tricuspid regurgitation.   4. Moderate to severe aortic valve stenosis. The aortic valve dimensionless  index is 0.27. The peak instantaneous gradient of the aortic valve is 49.0 mmHg.  The mean gradient of the aortic valve is 32.0 mmHg. DIVYA 0.85cm2 by VTI.   5. There is mild to moderate aortic valve regurgitation.   6. The estimated pulmonary artery pressure is mildly elevated with the RVSP at  43.9 mmHg.   Ejection Fractions:  LVEF 60-65%  Cath:  Right and Left heart cath 6/12/2020   1. Left main: no significant angiographic disease.  2. LAD: 30% proximal disease.  3. LCx: 40% proximal disease.  4. RCA: mild irregularities.  5. RA 7mmHg, RV 28/1, PA 24/9 (14), PCWP 7mmHg.  Stress Test:    Cardiac Imaging:  TAVR 7/7/2020   1. Transcatheter aortic valve replacement with successful implantation of  an  Diaz Sapien3 26 mm valve.  2. Patient was enrolled in a research study and data was included in the TVT  Registry.    Assessment/Plan   Very pleasant 89 year-old gentleman with history of prostate CA, carotid artery disease, and aortic stenosis s/p TAVR (7/2020). He continues to do well from a cardiac standpoint.  Swelling is minimal today.  He complains of intermittent dizziness, he will have a repeat echocardiogram to assess TAVR, Heart rate and blood pressure are well controlled today.      Plan   -call with any questions   -follow up in one year   -continue Aspirin 81 mg daily and Lisinopril 5 mg daily   -continue Plavix 75 mg daily  and Atorvastatin 20 mg daily   -echocardiogram   - will call to review the results       Jade Dyson, ADRIAN-CNP

## 2025-07-11 DIAGNOSIS — N40.1 BENIGN PROSTATIC HYPERPLASIA WITH INCOMPLETE BLADDER EMPTYING: ICD-10-CM

## 2025-07-11 DIAGNOSIS — N32.81 OVERACTIVE BLADDER: ICD-10-CM

## 2025-07-11 DIAGNOSIS — R39.14 BENIGN PROSTATIC HYPERPLASIA WITH INCOMPLETE BLADDER EMPTYING: ICD-10-CM

## 2025-07-12 RX ORDER — VIBEGRON 75 MG/1
75 TABLET, FILM COATED ORAL DAILY
Qty: 30 TABLET | Refills: 11 | Status: SHIPPED | OUTPATIENT
Start: 2025-07-12 | End: 2026-07-12

## 2025-07-14 DIAGNOSIS — N32.81 OVERACTIVE BLADDER: Primary | ICD-10-CM

## 2025-07-15 NOTE — PROGRESS NOTES
"  Patient ID: Mike Wiley is a 89 y.o. male who presents for No chief complaint on file..    Pt is here for First appointment.     Referring Provider: Edith Hearn MD  Reason for Referral: OAB, gemtesa assistance    Subjective     Medication and allergy reconciliation completed     Drug Interactions  No relevant drug interactions were noted.    Medication System Management  Patient's preferred pharmacy:     [facility] [address] [place] [address]  Phone: [phone] Fax: [phone]    Adherence/Organization: taking meds as prescribed  Affordability/Accessibility: Assess for [facility]      Medications Ordered Prior to Encounter[1]      Urinary Symptoms  Medications that may contribute to symptoms:   Hydrochlorothiazide   Any history of:   Narrow-angle glaucoma: no  Impaired gastric emptying: no  Urinary retention: yes  Prediabetes or diabetes:  no DM dx  Lab Results   Component Value Date    GLUCOSE 113 (H) 10/19/2020     No results found for: \"LEPTIN\", \"INSULFAST\", \"GLUF\"  Frequently of bowel movement: once daily  Tobacco use: yes, vape 1 oz/week  How many protective undergarments are you utilizing daily: 2/day    What medications have been tried/stopped?   Myrbetriq   Current medication? Gemtesa 75 mg every other day + tamsulosin 0.8 mg daily  When started? 6/2025  Side effects? Urinary rententions, resolved with taking gemtesa every other day  Improvement in symptoms? yes      Cardiovascular Health  The ASCVD Risk score (Helena ALBERT, et al., 2019) failed to calculate for the following reasons:    The 2019 ASCVD risk score is only valid for ages 40 to 79    Risk score cannot be calculated because patient has a medical history suggesting prior/existing ASCVD    No results found for: \"CHOL\"  No results found for: \"HDL\"  No results found for: \"LDLCALC\"  No results found for: \"TRIG\"  No components found for: \"CHOLHDL\"     Vitals  BP " Readings from Last 2 Encounters:   07/09/25 156/66   09/13/24 108/64     BMI Readings from Last 1 Encounters:   07/09/25 27.71 kg/m²        BMP  Lab Results   Component Value Date    CALCIUM 9.3 10/19/2020     10/19/2020    K 5.0 10/19/2020    CO2 30 10/19/2020     10/19/2020    BUN 18 10/19/2020    CREATININE 1.09 10/19/2020    EGFR 68 08/10/2018     Make sure GFR >15 ml/min or dose adjust    LFTs  Lab Results   Component Value Date    ALT 27 11/04/2019    AST 23 11/04/2019    ALKPHOS 63 11/04/2019    BILITOT 0.5 11/04/2019         Assessment/Plan     Patient is experiencing urinary frequency, urgency, and incontinence. Patient noted good improvement with Gemtesa so far, however he was experiencing urinary retention, so he has been taking Gemtesa every other day. Urinary retention has resolved with change  Has tried before myrbetriq  Discussed with patient regarding pharmacokinetic of Gemtesa, ok to take gemtesa every other day      Gemtesa   Discussed MOA: works by activating beta-3 adrenergic receptors in the bladder resulting in relaxation of the detrusor smooth muscle during the urine storage phase, thus increasing bladder capacity.  Provided education on administration and potential side effects including but not limited to hypertension 9%, hot flashes <2%, constipation/diarrhea <2%, dry mouth <2%, and headache <4%.   Gemtesa (vibegron) starts working almost immediately - within a few days of first taking it, with noticeable improvements in urinary urgency, frequency, and incontinence noted in clinical trials at 2 weeks which were reported as significant by 12 weeks.    Continue   Gemtesa 75 mg every other day     Patient Assistance Program (PAP)    Application for program to be submitted for the following medications: Gemtesa    Prescription Insurance:  Yes   Paid Test Claim:  Yes   County of Permanent Address:  [facility]   Members of Household:   1   Files Taxes:  Yes     Patient will be email  financial information to pharmacist directly at at this [facility] inbox.    Patient verbally reports monthly or yearly income which is less than 400% federal poverty level    Patient aware this process may take up to 6 weeks.     If approved medication must be filled through [facility] and MEDICATION WILL BE MAILED TO PATIENT.          Follow-up: 7/22/2025 2:40 PM      Time spent with pt: Total length of time 20 (minutes) of the encounter and more than 50% was spent counseling the patient.      Deana Espinal, PharmD   Meds Clinical Pharmacist  Phone: [phone]     Continue all meds under the continuation of care with the referring provider and clinical pharmacy team.    Verbal consent to manage patient's drug therapy was obtained from the patient. They were informed they may decline to participate or withdraw from participation in pharmacy services at any time.         [1]   Current Outpatient Medications on File Prior to Visit   Medication Sig Dispense Refill    aspirin 81 mg EC tablet Take 1 tablet (81 mg) by mouth once daily.      atorvastatin (Lipitor) 20 mg tablet Take 1 tablet (20 mg) by mouth once daily. 90 tablet 3    clopidogrel (Plavix) 75 mg tablet Take 1 tablet (75 mg) by mouth once daily.      hydroCHLOROthiazide (Microzide) 12.5 mg capsule Take 1 capsule (12.5 mg) by mouth once daily in the morning.      lisinopril 5 mg tablet Take 1 tablet (5 mg) by mouth once daily. 90 tablet 3    tamsulosin (Flomax) 0.4 mg 24 hr capsule Take 2 capsules (0.8 mg) by mouth early in the morning.. 60 capsule 11    Ventolin HFA 90 mcg/actuation inhaler       vibegron (Gemtesa) 75 mg tablet Take 1 tablet (75 mg) by mouth once daily. [id number] 5/26 30 tablet 11    [DISCONTINUED] atorvastatin (Lipitor) 20 mg tablet Take 1 tablet (20 mg) by mouth once daily. 90 tablet 3    [DISCONTINUED] lisinopril 5 mg tablet TAKE 1 TABLET ONE TIME DAILY (DOSE DECREASE)      [DISCONTINUED] vibegron (Gemtesa) 75 mg tablet Take 1 tablet  (75 mg) by mouth once daily. [id number]  5/26 45 tablet 0     No current facility-administered medications on file prior to visit.

## 2025-07-16 ENCOUNTER — TELEMEDICINE (OUTPATIENT)
Dept: PHARMACY | Facility: HOSPITAL | Age: OVER 89
End: 2025-07-16
Payer: MEDICARE

## 2025-07-16 DIAGNOSIS — N40.0 BENIGN PROSTATIC HYPERPLASIA, UNSPECIFIED WHETHER LOWER URINARY TRACT SYMPTOMS PRESENT: Primary | ICD-10-CM

## 2025-07-22 ENCOUNTER — APPOINTMENT (OUTPATIENT)
Dept: PHARMACY | Facility: HOSPITAL | Age: OVER 89
End: 2025-07-22
Payer: MEDICARE

## 2025-07-22 DIAGNOSIS — N40.0 BENIGN PROSTATIC HYPERPLASIA, UNSPECIFIED WHETHER LOWER URINARY TRACT SYMPTOMS PRESENT: Primary | ICD-10-CM

## 2025-07-22 DIAGNOSIS — R39.14 BENIGN PROSTATIC HYPERPLASIA WITH INCOMPLETE BLADDER EMPTYING: ICD-10-CM

## 2025-07-22 DIAGNOSIS — N40.1 BENIGN PROSTATIC HYPERPLASIA WITH INCOMPLETE BLADDER EMPTYING: ICD-10-CM

## 2025-07-22 RX ORDER — VIBEGRON 75 MG/1
75 TABLET, FILM COATED ORAL DAILY
Qty: 30 TABLET | Refills: 11 | Status: SHIPPED | OUTPATIENT
Start: 2025-07-22 | End: 2026-07-22

## 2025-07-22 NOTE — PROGRESS NOTES
"  Patient ID: Mike Wiley is a 89 y.o. male who presents for No chief complaint on file..    Pt is here for Follow Up appointment. This appointment was completed with daughter    Referring Provider: Edith Hearn MD  Reason for Referral: OAB, gemtesa assistance    Subjective     Medication and allergy reconciliation completed     Drug Interactions  No relevant drug interactions were noted.    Medication System Management  Patient's preferred pharmacy:     [facility] [address] [place], [place] [address]  Phone: [phone] Fax: [phone]    Adherence/Organization: taking meds as prescribed  Affordability/Accessibility: Assess for [facility]      Medications Ordered Prior to Encounter[1]      Urinary Symptoms  Medications that may contribute to symptoms:   Hydrochlorothiazide   Any history of:   Narrow-angle glaucoma: no  Impaired gastric emptying: no  Urinary retention: yes  Prediabetes or diabetes:  no DM dx  Lab Results   Component Value Date    GLUCOSE 113 (H) 10/19/2020     No results found for: \"LEPTIN\", \"INSULFAST\", \"GLUF\"  Frequently of bowel movement: once daily  Tobacco use: yes, vape 1 oz/week  How many protective undergarments are you utilizing daily: 2/day    What medications have been tried/stopped?   Myrbetriq   Current medication? Gemtesa 75 mg every other day + tamsulosin 0.8 mg daily  When started? 6/2025  Side effects? Urinary rententions, resolved with taking gemtesa every other day  Improvement in symptoms? yes      Cardiovascular Health  The ASCVD Risk score (Helena DK, et al., 2019) failed to calculate for the following reasons:    The 2019 ASCVD risk score is only valid for ages 40 to 79    Risk score cannot be calculated because patient has a medical history suggesting prior/existing ASCVD    No results found for: \"CHOL\"  No results found for: \"HDL\"  No results found for: \"LDLCALC\"  No results found for: \"TRIG\"  No " "components found for: \"CHOLHDL\"     Vitals  BP Readings from Last 2 Encounters:   07/09/25 156/66   09/13/24 108/64     BMI Readings from Last 1 Encounters:   07/09/25 27.71 kg/m²        BMP  Lab Results   Component Value Date    CALCIUM 9.3 10/19/2020     10/19/2020    K 5.0 10/19/2020    CO2 30 10/19/2020     10/19/2020    BUN 18 10/19/2020    CREATININE 1.09 10/19/2020    EGFR 68 08/10/2018     Make sure GFR >15 ml/min or dose adjust    LFTs  Lab Results   Component Value Date    ALT 27 11/04/2019    AST 23 11/04/2019    ALKPHOS 63 11/04/2019    BILITOT 0.5 11/04/2019         Assessment/Plan     Family noted patient no longer experienced urinary frequency and incontinence with Gemtesa, no side effect reported at this visit  Has tried before myrbetriq  Discussed with patient regarding pharmacokinetic of Gemtesa, ok to take gemtesa every other day      Gemtesa   Discussed MOA: works by activating beta-3 adrenergic receptors in the bladder resulting in relaxation of the detrusor smooth muscle during the urine storage phase, thus increasing bladder capacity.  Provided education on administration and potential side effects including but not limited to hypertension 9%, hot flashes <2%, constipation/diarrhea <2%, dry mouth <2%, and headache <4%.   Gemtesa (vibegron) starts working almost immediately - within a few days of first taking it, with noticeable improvements in urinary urgency, frequency, and incontinence noted in clinical trials at 2 weeks which were reported as significant by 12 weeks.    Continue   Gemtesa 75 mg every other day     Patient Assistance Program (PAP)    Application for program to be submitted for the following medications: Gemtesa    Prescription Insurance:  Yes   Paid Test Claim:  Yes   County of Permanent Address:  [facility]   Members of Household:   1   Files Taxes:  Yes     Patient will be email financial information to pharmacist directly at at this [facility] inbox.    Patient " verbally reports monthly or yearly income which is less than 400% federal poverty level    Patient aware this process may take up to 6 weeks.     If approved medication must be filled through [facility] and MEDICATION WILL BE MAILED TO PATIENT.      Follow-up: 8/25/2025 2:00 PM      Time spent with pt: Total length of time 20 (minutes) of the encounter and more than 50% was spent counseling the patient.      Juan MijaresD   Meds Clinical Pharmacist  Phone: [phone]     Continue all meds under the continuation of care with the referring provider and clinical pharmacy team.    Verbal consent to manage patient's drug therapy was obtained from the patient. They were informed they may decline to participate or withdraw from participation in pharmacy services at any time.         [1]   Current Outpatient Medications on File Prior to Visit   Medication Sig Dispense Refill    aspirin 81 mg EC tablet Take 1 tablet (81 mg) by mouth once daily.      atorvastatin (Lipitor) 20 mg tablet Take 1 tablet (20 mg) by mouth once daily. 90 tablet 3    clopidogrel (Plavix) 75 mg tablet Take 1 tablet (75 mg) by mouth once daily.      hydroCHLOROthiazide (Microzide) 12.5 mg capsule Take 1 capsule (12.5 mg) by mouth once daily in the morning.      lisinopril 5 mg tablet Take 1 tablet (5 mg) by mouth once daily. 90 tablet 3    tamsulosin (Flomax) 0.4 mg 24 hr capsule Take 2 capsules (0.8 mg) by mouth early in the morning.. 60 capsule 11    Ventolin HFA 90 mcg/actuation inhaler       vibegron (Gemtesa) 75 mg tablet Take 1 tablet (75 mg) by mouth once daily. [id number] 5/26 30 tablet 11     No current facility-administered medications on file prior to visit.

## 2025-07-23 PROCEDURE — RXMED WILLOW AMBULATORY MEDICATION CHARGE

## 2025-07-28 ENCOUNTER — PHARMACY VISIT (OUTPATIENT)
Dept: PHARMACY | Facility: CLINIC | Age: OVER 89
End: 2025-07-28
Payer: MEDICARE

## 2025-08-01 DIAGNOSIS — C61 MALIGNANT NEOPLASM OF PROSTATE (MULTI): ICD-10-CM

## 2025-08-07 ENCOUNTER — HOSPITAL ENCOUNTER (OUTPATIENT)
Dept: CARDIOLOGY | Facility: HOSPITAL | Age: OVER 89
Discharge: HOME | End: 2025-08-07
Payer: MEDICARE

## 2025-08-07 DIAGNOSIS — I35.0 NONRHEUMATIC AORTIC (VALVE) STENOSIS: ICD-10-CM

## 2025-08-07 PROCEDURE — 2500000004 HC RX 250 GENERAL PHARMACY W/ HCPCS (ALT 636 FOR OP/ED): Performed by: INTERNAL MEDICINE

## 2025-08-07 PROCEDURE — C8929 TTE W OR WO FOL WCON,DOPPLER: HCPCS

## 2025-08-07 RX ADMIN — PERFLUTREN 2 ML OF DILUTION: 6.52 INJECTION, SUSPENSION INTRAVENOUS at 14:14

## 2025-08-19 LAB
AORTIC VALVE MEAN GRADIENT: 7 MMHG
AORTIC VALVE PEAK VELOCITY: 1.81 M/S
AV PEAK GRADIENT: 13 MMHG
AVA (PEAK VEL): 1.15 CM2
AVA (VTI): 1 CM2
EJECTION FRACTION APICAL 4 CHAMBER: 55.2
EJECTION FRACTION: 58 %
LEFT VENTRICULAR OUTFLOW TRACT DIAMETER: 2.07 CM
MITRAL VALVE E/A RATIO: 0.57
RIGHT VENTRICLE FREE WALL PEAK S': 8 CM/S
RIGHT VENTRICLE PEAK SYSTOLIC PRESSURE: 26 MMHG
TRICUSPID ANNULAR PLANE SYSTOLIC EXCURSION: 1.3 CM

## 2025-08-24 PROCEDURE — RXMED WILLOW AMBULATORY MEDICATION CHARGE

## 2025-08-25 ENCOUNTER — APPOINTMENT (OUTPATIENT)
Dept: PHARMACY | Facility: HOSPITAL | Age: OVER 89
End: 2025-08-25
Payer: MEDICARE

## 2025-08-25 DIAGNOSIS — N40.0 BENIGN PROSTATIC HYPERPLASIA, UNSPECIFIED WHETHER LOWER URINARY TRACT SYMPTOMS PRESENT: ICD-10-CM

## 2025-08-27 ENCOUNTER — RESULTS FOLLOW-UP (OUTPATIENT)
Dept: CARDIOLOGY | Facility: HOSPITAL | Age: OVER 89
End: 2025-08-27
Payer: MEDICARE

## 2025-08-27 ENCOUNTER — PHARMACY VISIT (OUTPATIENT)
Dept: PHARMACY | Facility: CLINIC | Age: OVER 89
End: 2025-08-27
Payer: MEDICARE

## 2025-09-19 ENCOUNTER — APPOINTMENT (OUTPATIENT)
Dept: UROLOGY | Facility: CLINIC | Age: OVER 89
End: 2025-09-19
Payer: MEDICARE

## 2026-07-06 ENCOUNTER — APPOINTMENT (OUTPATIENT)
Dept: PHARMACY | Facility: HOSPITAL | Age: OVER 89
End: 2026-07-06
Payer: MEDICARE